# Patient Record
Sex: FEMALE | Race: WHITE | HISPANIC OR LATINO | Employment: UNEMPLOYED | URBAN - METROPOLITAN AREA
[De-identification: names, ages, dates, MRNs, and addresses within clinical notes are randomized per-mention and may not be internally consistent; named-entity substitution may affect disease eponyms.]

---

## 2017-08-23 ENCOUNTER — OFFICE VISIT (OUTPATIENT)
Dept: MEDICAL GROUP | Facility: PHYSICIAN GROUP | Age: 57
End: 2017-08-23
Payer: OTHER GOVERNMENT

## 2017-08-23 VITALS
BODY MASS INDEX: 32.59 KG/M2 | HEIGHT: 60 IN | DIASTOLIC BLOOD PRESSURE: 72 MMHG | OXYGEN SATURATION: 96 % | WEIGHT: 166 LBS | RESPIRATION RATE: 14 BRPM | SYSTOLIC BLOOD PRESSURE: 130 MMHG | TEMPERATURE: 98.4 F | HEART RATE: 68 BPM

## 2017-08-23 DIAGNOSIS — K76.89 HEPATIC CYST: ICD-10-CM

## 2017-08-23 DIAGNOSIS — M67.40 GANGLION CYST: ICD-10-CM

## 2017-08-23 DIAGNOSIS — R92.1 BREAST CALCIFICATIONS ON MAMMOGRAM: ICD-10-CM

## 2017-08-23 DIAGNOSIS — E78.2 MIXED HYPERLIPIDEMIA: ICD-10-CM

## 2017-08-23 DIAGNOSIS — F41.9 ANXIETY: ICD-10-CM

## 2017-08-23 DIAGNOSIS — E66.9 OBESITY (BMI 30-39.9): ICD-10-CM

## 2017-08-23 DIAGNOSIS — G47.33 OSA ON CPAP: ICD-10-CM

## 2017-08-23 PROCEDURE — 99204 OFFICE O/P NEW MOD 45 MIN: CPT | Performed by: NURSE PRACTITIONER

## 2017-08-23 RX ORDER — FLUOXETINE HYDROCHLORIDE 20 MG/1
20 CAPSULE ORAL DAILY
COMMUNITY
End: 2017-08-23 | Stop reason: SDUPTHER

## 2017-08-23 RX ORDER — FLUTICASONE PROPIONATE 50 MCG
1 SPRAY, SUSPENSION (ML) NASAL DAILY
COMMUNITY
End: 2017-08-23 | Stop reason: SDUPTHER

## 2017-08-23 RX ORDER — FLUTICASONE PROPIONATE 50 MCG
1 SPRAY, SUSPENSION (ML) NASAL DAILY
Qty: 16 G | Refills: 6 | Status: SHIPPED | OUTPATIENT
Start: 2017-08-23 | End: 2018-09-28 | Stop reason: SDUPTHER

## 2017-08-23 RX ORDER — ROSUVASTATIN CALCIUM 10 MG/1
10 TABLET, COATED ORAL EVERY EVENING
Qty: 90 TAB | Refills: 2 | Status: SHIPPED | OUTPATIENT
Start: 2017-08-23 | End: 2018-12-04 | Stop reason: SDUPTHER

## 2017-08-23 RX ORDER — ROSUVASTATIN CALCIUM 10 MG/1
10 TABLET, COATED ORAL EVERY EVENING
COMMUNITY
End: 2017-08-23 | Stop reason: SDUPTHER

## 2017-08-23 RX ORDER — FLUOXETINE HYDROCHLORIDE 20 MG/1
20 CAPSULE ORAL DAILY
Qty: 90 CAP | Refills: 3 | Status: SHIPPED | OUTPATIENT
Start: 2017-08-23 | End: 2018-08-31 | Stop reason: SDUPTHER

## 2017-08-23 ASSESSMENT — PATIENT HEALTH QUESTIONNAIRE - PHQ9: CLINICAL INTERPRETATION OF PHQ2 SCORE: 0

## 2017-08-23 NOTE — MR AVS SNAPSHOT
Rima Pope   2017 1:20 PM   Office Visit   MRN: [id number]    Department:  [facility]   Dept Phone:  [phone]    Description:  Female : 1960   Provider:  MEDHAT Orr           Reason for Visit     Establish Care     Medication Refill           Allergies as of 2017     Allergen Noted Reactions    Sulfa Drugs 2017         You were diagnosed with     Mixed hyperlipidemia   [272.2.ICD-9-CM]       Hepatic cyst   [939272]       Breast calcifications on mammogram   [778473]       Ganglion cyst   [589606]       Obesity (BMI 30-39.9)   [367693]       MAXX on CPAP   [037833]         Vital Signs     Blood Pressure Pulse Temperature Respirations Height Weight    130/72 mmHg 68 36.9 °C (98.4 °F) 14 1.524 m (5') 75.297 kg (166 lb)    Body Mass Index Oxygen Saturation Smoking Status             32.42 kg/m2 96% Never Smoker          Basic Information     Date Of Birth Sex Race Ethnicity Preferred Language    1960 Female White  Origin (Italian,Rwandan,Bhutanese,Tej, etc) English      Problem List              ICD-10-CM Priority Class Noted - Resolved    Mixed hyperlipidemia E78.2   2017 - Present    Hepatic cyst K76.89   2017 - Present    Breast calcifications on mammogram R92.1   2017 - Present    Ganglion cyst M67.40   2017 - Present    Obesity (BMI 30-39.9) E66.9   2017 - Present    MAXX on CPAP G47.33, Z99.89   2017 - Present      Health Maintenance     Patient has no pending health maintenance at this time      Current Immunizations     No immunizations on file.      Below and/or attached are the medications your provider expects you to take. Review all of your home medications and newly ordered medications with your provider and/or pharmacist. Follow medication instructions as directed by your provider and/or pharmacist. Please keep your medication list with you and share with your provider. Update the information when  medications are discontinued, doses are changed, or new medications (including over-the-counter products) are added; and carry medication information at all times in the event of emergency situations     Allergies:  SULFA DRUGS - (reactions not documented)               Medications  Valid as of: August 23, 2017 -  2:01 PM    Generic Name Brand Name Tablet Size Instructions for use    FLUoxetine HCl (Cap) PROZAC 20 MG Take 1 Cap by mouth every day.        Fluticasone Propionate (Suspension) FLONASE 50 MCG/ACT Spray 1 Spray in nose every day.        Rosuvastatin Calcium (Tab) CRESTOR 10 MG Take 1 Tab by mouth every evening.        .                 Medicines prescribed today were sent to:     [facility] [address] [place] [address] [place]    [address] [place] [id number]    Phone: [phone] Fax: [phone]    Open 24 Hours?: No      Medication refill instructions:       If your prescription bottle indicates you have medication refills left, it is not necessary to call your provider’s office. Please contact your pharmacy and they will refill your medication.    If your prescription bottle indicates you do not have any refills left, you may request refills at any time through one of the following ways: The online [employer] system (except Urgent Care), by calling your provider’s office, or by asking your pharmacy to contact your provider’s office with a refill request. Medication refills are processed only during regular business hours and may not be available until the next business day. Your provider may request additional information or to have a follow-up visit with you prior to refilling your medication.   *Please Note: Medication refills are assigned a new Rx number when refilled electronically. Your pharmacy may indicate that no refills were authorized even though a new prescription for the same medication is available at the pharmacy. Please request  the medicine by name with the pharmacy before contacting your provider for a refill.        Referral     A referral request has been sent to our patient care coordination department. Please allow 3-5 business days for us to process this request and contact you either by phone or mail. If you do not hear from us by the 5th business day, please call us at [phone].           avox Access Code: [email or web address]  Expires: 9/22/2017  2:01 PM    avox  A secure, online tool to manage your health information     [employer]’s avox® is a secure, online tool that connects you to your personalized health information from the privacy of your home -- day or night - making it very easy for you to manage your healthcare. Once the activation process is completed, you can even access your medical information using the avox john, which is available for free in the [employer] John store or Google Play store.     avox provides the following levels of access (as shown below):   My Chart Features   [facility] Primary Care Doctor [facility]  Specialists [facility]  Urgent  [facility]  Primary Care  Doctor   Email your healthcare team securely and privately 24/7 X X X    Manage appointments: schedule your next appointment; view details of past/upcoming appointments X      Request prescription refills. X      View recent personal medical records, including lab and immunizations X X X X   View health record, including health history, allergies, medications X X X X   Read reports about your outpatient visits, procedures, consult and ER notes X X X X   See your discharge summary, which is a recap of your hospital and/or ER visit that includes your diagnosis, lab results, and care plan. X X       How to register for avox:  1. Go to  [email or web address].  2. Click on the Sign Up Now box, which takes you to the New Member Sign Up page. You will need to provide the following information:  a. Enter your avox Access Code  exactly as it appears at the top of this page. (You will not need to use this code after you’ve completed the sign-up process. If you do not sign up before the expiration date, you must request a new code.)   b. Enter your date of birth.   c. Enter your home email address.   d. Click Submit, and follow the next screen’s instructions.  3. Create a [employer] ID. This will be your [employer] login ID and cannot be changed, so think of one that is secure and easy to remember.  4. Create a [employer] password. You can change your password at any time.  5. Enter your Password Reset Question and Answer. This can be used at a later time if you forget your password.   6. Enter your e-mail address. This allows you to receive e-mail notifications when new information is available in [employer].  7. Click Sign Up. You can now view your health information.    For assistance activating your [employer] account, call [phone]

## 2017-08-23 NOTE — ASSESSMENT & PLAN NOTE
Ultrasound showed a cyst.  Liver enzymes elevated and then she stopped all tylenol and alcohol and within 3 months her enzymes returned to normal.  No hepatitis.

## 2017-08-23 NOTE — ASSESSMENT & PLAN NOTE
Patient reports that she is watching what she is eating and drinking. She has a strong family history of alcoholism and so she has been watching calories and alcohol intake. She is recently moved here and is excited about hiking. She has some friends that she walks and hikes with.

## 2017-08-23 NOTE — ASSESSMENT & PLAN NOTE
Taking crestor 5 mg daily. Patient had recent labs in [facility] in March or February. Patient will bring those labs 5 for us to review.

## 2017-08-23 NOTE — ASSESSMENT & PLAN NOTE
Patient states that she has been on Prozac for approximately 12 years. She started on Prozac after her hysterectomy in her early 40s. She states it helped her with her menopausal symptoms and also has helps her with her anxiety.

## 2017-08-23 NOTE — ASSESSMENT & PLAN NOTE
Patient reports a small nontender lump on the inner aspect of her right wrist just below her thumb. States that she uses this hand for repetitive motion often. She denies numbness or tingling. She states it has been there approximately 2-3 months.

## 2017-08-23 NOTE — ASSESSMENT & PLAN NOTE
Wearing CPAP most nights. Has been on CPAP for approximately 4-5 years. She would like an updated machine and appliances. She is requesting referral to pulmonology at this time. States that since she has started using CPAP she does sleep better and she does feel more rested in the mornings.

## 2017-08-23 NOTE — PROGRESS NOTES
Rima Pope is a 56 y.o. white female here today to establish care and for evaluation and management of:    HPI: Rima is a . 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  Hepatic cyst  Ultrasound showed a cyst.  Liver enzymes elevated and then she stopped all tylenol and alcohol and within 3 months her enzymes returned to normal.  No hepatitis.     Mixed hyperlipidemia  Taking crestor 5 mg daily. Patient had recent labs in [facility] in March or February. Patient will bring those labs 5 for us to review.    Breast calcifications on mammogram  Has had some breast biopsies that were all negative.  All mammograms negative.     MAXX on CPAP  Wearing CPAP most nights. Has been on CPAP for approximately 4-5 years. She would like an updated machine and appliances. She is requesting referral to pulmonology at this time. States that since she has started using CPAP she does sleep better and she does feel more rested in the mornings.    Obesity (BMI 30-39.9)  Patient reports that she is watching what she is eating and drinking. She has a strong family history of alcoholism and so she has been watching calories and alcohol intake. She is recently moved here and is excited about hiking. She has some friends that she walks and hikes with.    Ganglion cyst  Patient reports a small nontender lump on the inner aspect of her right wrist just below her thumb. States that she uses this hand for repetitive motion often. She denies numbness or tingling. She states it has been there approximately 2-3 months.    Anxiety  Patient states that she has been on Prozac for approximately 12 years. She started on Prozac after her hysterectomy in her early 40s. She states it helped her with her menopausal symptoms and also has helps her with her anxiety.        Current medicines (including changes today)  Current Outpatient Prescriptions   Medication Sig Dispense Refill   •  fluoxetine (PROZAC) 20 MG Cap Take 1 Cap by mouth every day. 90 Cap 3   • fluticasone (FLONASE) 50 MCG/ACT nasal spray Spray 1 Spray in nose every day. 16 g 6   • rosuvastatin (CRESTOR) 10 MG Tab Take 1 Tab by mouth every evening. 90 Tab 2     No current facility-administered medications for this visit.       She  has a past medical history of Hyperlipidemia and Anxiety.    She  has past surgical history that includes primary c section; abdominal hysterectomy total; and hernia repair.    Social History   Substance Use Topics   • Smoking status: Never Smoker    • Smokeless tobacco: Never Used   • Alcohol Use: 1.2 oz/week     2 Glasses of wine per week      Comment: week       Social History     Social History Narrative   • No narrative on file       Family History   Problem Relation Age of Onset   • Cancer Mother      lung, bladder, melanoma   • Hyperlipidemia Mother    • Alcohol/Drug Father    • Alcohol/Drug Sister    • Alcohol/Drug Brother    • Alcohol/Drug Brother        Family Status   Relation Status Death Age   • Mother Alive    • Father     • Sister     • Brother Alive    • Brother           ROS  As stated in history of present illness.  All other systems reviewed and are negative     Objective:     Blood pressure 130/72, pulse 68, temperature 36.9 °C (98.4 °F), resp. rate 14, height 1.524 m (5'), weight 75.297 kg (166 lb), SpO2 96 %. Body mass index is 32.42 kg/(m^2).  Physical Exam:    Constitutional: Alert, no distress.  Skin: Warm, dry, good turgor, no rashes in visible areas.  Eye: Equal, round and reactive, conjunctiva clear, lids normal.  ENMT: Lips without lesions, good dentition, oropharynx clear.  Neck: Trachea midline, no masses, no thyromegaly. No cervical or supraclavicular lymphadenopathy.  Respiratory: Unlabored respiratory effort, lungs clear to auscultation, no wheezes, no ronchi.  Cardiovascular: Normal S1, S2, no murmur, no edema.  Abdomen: Soft, non-tender, no  masses, no hepatosplenomegaly.  Psych: Alert and oriented x3, normal affect and mood.  MSK: Right ganglion cyst on wrist. Nontender no numbness or tingling. No redness no fever      Assessment and Plan:   The following treatment plan was discussed    1. Mixed hyperlipidemia  This is a new problem to me. Chronic. Ongoing. Continue with Crestor 5 mg by mouth daily. Will obtain most recent lab results to review. Follow-up in one year.    2. Hepatic cyst  This is a new problem to me. Chronic. Stabilized. Patient would like to monitor this hepatic cyst with ultrasound every few years. We will take a look at her last liver function tests. Monitor and follow.    3. Breast calcifications on mammogram  This is a new problem to me. Chronic. Stable. Last mammogram was April of this year. No abnormal findings. Will be due for mammogram in one year. Monitor and follow.    4. Ganglion cyst  This is a new problem to me. Acute. Stable. Monitor and watch. If the cyst grows or becomes painful will consider referral to a hand surgeon. Monitor.    5. Obesity (BMI 30-39.9)  This is a new problem to me. Chronic. Ongoing. Discussed diet and exercise. Encouraged her hiking. Encourage daily walking. Monitor and follow.  - Patient identified as having weight management issue.  Appropriate orders and counseling given.    6. MAXX on CPAP  This is a new problem to me. Chronic. Ongoing. Referral to pulmonology for evaluation of her CPAP. Monitor follow-up.  - REFERRAL TO PULMONOLOGY    7. Anxiety  This is a new problem to me. Chronic. Stable. Continue Prozac 20 mg by mouth daily. Denies any suicidal ideations. Monitor and follow.      Records requested.  Followup: Return in about 1 year (around 8/23/2018) for Annual.

## 2017-09-26 ENCOUNTER — APPOINTMENT (OUTPATIENT)
Dept: SLEEP MEDICINE | Facility: MEDICAL CENTER | Age: 57
End: 2017-09-26
Payer: OTHER GOVERNMENT

## 2017-10-19 ENCOUNTER — OFFICE VISIT (OUTPATIENT)
Dept: MEDICAL GROUP | Facility: PHYSICIAN GROUP | Age: 57
End: 2017-10-19
Payer: OTHER GOVERNMENT

## 2017-10-19 VITALS
BODY MASS INDEX: 32.79 KG/M2 | WEIGHT: 167 LBS | TEMPERATURE: 98.1 F | RESPIRATION RATE: 14 BRPM | HEIGHT: 60 IN | HEART RATE: 67 BPM | OXYGEN SATURATION: 98 % | SYSTOLIC BLOOD PRESSURE: 124 MMHG | DIASTOLIC BLOOD PRESSURE: 80 MMHG

## 2017-10-19 DIAGNOSIS — Z12.11 ENCOUNTER FOR SCREENING FECAL OCCULT BLOOD TESTING: ICD-10-CM

## 2017-10-19 DIAGNOSIS — E78.2 MIXED HYPERLIPIDEMIA: ICD-10-CM

## 2017-10-19 DIAGNOSIS — J32.0 CHRONIC MAXILLARY SINUSITIS: ICD-10-CM

## 2017-10-19 PROCEDURE — 99214 OFFICE O/P EST MOD 30 MIN: CPT | Performed by: NURSE PRACTITIONER

## 2017-10-19 RX ORDER — AZITHROMYCIN 250 MG/1
TABLET, FILM COATED ORAL
Qty: 6 TAB | Refills: 0 | Status: SHIPPED | OUTPATIENT
Start: 2017-10-19 | End: 2017-10-24

## 2017-10-19 NOTE — ASSESSMENT & PLAN NOTE
Started with a cold 3 weeks ago that started getting better but now has developed left facial pain, swelling on left side, low grade fever. Green exudate, fatigue. Pursuing this.

## 2017-10-19 NOTE — PROGRESS NOTES
Chief Complaint   Patient presents with   • Sinus Problem       Subjective:   Rima Pope is a 56 y.o. female here today for evaluation and management of:    Chronic maxillary sinusitis  Started with a cold 3 weeks ago that started getting better but now has developed left facial pain, swelling on left side, low grade fever. Green exudate, fatigue. Pursuing this.    Mixed hyperlipidemia  Continues crestor 5 mg.             Current medicines (including changes today)  Current Outpatient Prescriptions   Medication Sig Dispense Refill   • azithromycin (ZITHROMAX) 250 MG Tab 2 tabs by mouth day 1, 1 tab by mouth days 2-5 6 Tab 0   • fluoxetine (PROZAC) 20 MG Cap Take 1 Cap by mouth every day. 90 Cap 3   • fluticasone (FLONASE) 50 MCG/ACT nasal spray Spray 1 Spray in nose every day. 16 g 6   • rosuvastatin (CRESTOR) 10 MG Tab Take 1 Tab by mouth every evening. 90 Tab 2     No current facility-administered medications for this visit.      She  has a past medical history of Anxiety and Hyperlipidemia.    Maguire stated in history of present illness.  No chest pain, no shortness of breath, no abdominal pain       Objective:     Blood pressure 124/80, pulse 67, temperature 36.7 °C (98.1 °F), resp. rate 14, height 1.524 m (5'), weight 75.8 kg (167 lb), SpO2 98 %. Body mass index is 32.61 kg/m². Afebrile after ibuprofen this morning  Physical Exam:  Constitutional: Alert, no distress.  Skin: Warm, dry, good turgor,no cyanosis, no rashes in visible areas.  Eye: Equal, round and reactive, conjunctiva clear, lids normal.  Ears: No tenderness, no discharge.  External canals are without any significant edema or erythema.  Tympanic membranes are without any inflammation, no effusion.  Gross auditory acuity is intact.  Nose: Left nares extremely red and swollen yellow exudate left face has extreme tenderness and swelling noted over cheek bone. Right face without swelling but is tender to touch.  Mouth/Throat: lips without lesion.   Oropharynx clear.  Throat without erythema, exudates or tonsillar enlargement.  Neck: Trachea midline, no masses, no obvious thyroid enlargement.. No cervical or supraclavicular lymphadenopathy. Range of motion within normal limits.  Neuro: Cranial nerves 2-12 grossly intact.  No sensory deficit.  Respiratory: Unlabored respiratory effort, lungs clear to auscultation, no wheezes, no ronchi.  Cardiovascular: Normal S1, S2, no murmur, no edema.  Psych: Alert and oriented x3, normal affect and mood and judgement.        Assessment and Plan:   The following treatment plan was discussed    1. Chronic maxillary sinusitis  This is a new problem to me. Acute. An stable. Z-Inder ordered per patient request. Increase fluids increase nasal irrigation. Continue with Flonase daily. Continue with controlling allergies. Referral to ENT placed. Monitor and follow.  - REFERRAL TO ENT    2. Mixed hyperlipidemia  Chronic issue. Patient will be due for labs in 6 months. Labs provided. Patient to return in 6 months to follow-up on her hyperlipidemia. Continue with Crestor 5 mg daily. Diet and exercise reviewed. Monitor and follow.  - COMP METABOLIC PANEL; Future  - LIPID PROFILE; Future      Followup: Return in about 6 months (around 4/19/2018) for hyperlipidemia.

## 2017-10-20 ENCOUNTER — SLEEP CENTER VISIT (OUTPATIENT)
Dept: SLEEP MEDICINE | Facility: MEDICAL CENTER | Age: 57
End: 2017-10-20
Payer: OTHER GOVERNMENT

## 2017-10-20 VITALS
OXYGEN SATURATION: 95 % | DIASTOLIC BLOOD PRESSURE: 82 MMHG | SYSTOLIC BLOOD PRESSURE: 122 MMHG | HEIGHT: 60 IN | HEART RATE: 78 BPM | BODY MASS INDEX: 33.16 KG/M2 | WEIGHT: 168.9 LBS | TEMPERATURE: 97.5 F | RESPIRATION RATE: 16 BRPM

## 2017-10-20 DIAGNOSIS — G47.33 OSA ON CPAP: ICD-10-CM

## 2017-10-20 DIAGNOSIS — E66.9 OBESITY (BMI 30-39.9): ICD-10-CM

## 2017-10-20 PROCEDURE — 99203 OFFICE O/P NEW LOW 30 MIN: CPT | Performed by: INTERNAL MEDICINE

## 2017-10-20 RX ORDER — ZOLPIDEM TARTRATE 5 MG/1
5 TABLET ORAL NIGHTLY PRN
Qty: 3 TAB | Refills: 0 | Status: SHIPPED | OUTPATIENT
Start: 2017-10-20 | End: 2018-06-27

## 2017-10-20 NOTE — PROGRESS NOTES
Chief Complaint   Patient presents with   • Establish Care     Referred by RUSSEL Orr for MAXX.       HPI: This patient is a 56 y.o. female who is referred for obstructive sleep apnea. She was diagnosed with MAXX in [place] 5 years ago and has been on CPAP therapy since then. She forgot her CPAP compliance chip today. She states she uses CPAP nightly, and feels rested, however requires new CPAP supplies. Sleep study results are unavailable. Sleep diary was reviewed. She goes to bed by 11:30 PM, falling asleep within 15 minutes. She denies any significant awakenings. She wakes up at 8 AM feeling rested. Her [place] sleepiness score is 7. Her BMI is greater than 30.      Past Medical History:   Diagnosis Date   • Allergic rhinitis    • Anxiety    • Hyperlipidemia    • Nasal drainage        Social History     Social History   • Marital status:      Spouse name: N/A   • Number of children: N/A   • Years of education: N/A     Occupational History   • Not on file.     Social History Main Topics   • Smoking status: Never Smoker   • Smokeless tobacco: Never Used   • Alcohol use 1.2 oz/week     2 Glasses of wine per week      Comment: week   • Drug use: No   • Sexual activity: Yes     Partners: Male     Other Topics Concern   • Not on file     Social History Narrative   • No narrative on file       Family History   Problem Relation Age of Onset   • Cancer Mother      lung, bladder, melanoma   • Hyperlipidemia Mother    • Alcohol/Drug Father    • Alcohol/Drug Sister    • Alcohol/Drug Brother    • Alcohol/Drug Brother    • No Known Problems Son    • No Known Problems Daughter        Current Outpatient Prescriptions on File Prior to Visit   Medication Sig Dispense Refill   • azithromycin (ZITHROMAX) 250 MG Tab 2 tabs by mouth day 1, 1 tab by mouth days 2-5 6 Tab 0   • fluoxetine (PROZAC) 20 MG Cap Take 1 Cap by mouth every day. 90 Cap 3   • fluticasone (FLONASE) 50 MCG/ACT nasal spray Spray 1 Spray in nose every  day. 16 g 6   • rosuvastatin (CRESTOR) 10 MG Tab Take 1 Tab by mouth every evening. 90 Tab 2     No current facility-administered medications on file prior to visit.        Allergies: Sulfa drugs    ROS:   Constitutional: Denies fevers, chills, night sweats, fatigue or weight loss  Eyes: Denies vision loss, pain, drainage, double vision  Ears, Nose, Throat: Denies earache, difficulty hearing, tinnitus, nasal congestion, hoarseness  Cardiovascular: Denies chest pain, tightness, palpitations, orthopnea or edema  Respiratory: Denies shortness of breath, cough, wheezing, hemoptysis  Sleep: Denies daytime sleepiness, snoring, apneas, insomnia, morning headaches  GI: Denies heartburn, dysphagia, nausea, abdominal pain, diarrhea or constipation  : Denies frequent urination, hematuria, discharge or painful urination  Musculoskeletal: Denies back pain, painful joints, sore muscles  Neurological: Denies weakness or headaches  Skin: No rashes    Blood pressure 122/82, pulse 78, temperature 36.4 °C (97.5 °F), resp. rate 16, height 1.524 m (5'), weight 76.6 kg (168 lb 14.4 oz), SpO2 95 %.    Physical Exam:  Appearance: Well-nourished, well-developed, in no acute distress  HEENT: Normocephalic, atraumatic, white sclera, PERRLA, oropharynx clear, Mallampati 3  Neck: No adenopathy or masses  Respiratory: no intercostal retractions or accessory muscle use  Lungs auscultation: Clear to auscultation bilaterally  Cardiovascular: Regular rate rhythm. No murmurs, rubs or gallops.  No LE edema  Abdomen: soft, nondistended  Gait: Normal  Digits: No clubbing, cyanosis  Motor: No focal deficits  Orientation: Oriented to time, person and place    Diagnosis:  1. MAXX on CPAP  POLYSOMNOGRAPHY, 4 OR MORE    zolpidem (AMBIEN) 5 MG Tab   2. Obesity (BMI 30-39.9)         Plan:  The patient has a history of obstructive sleep apnea diagnosed in [place] 5 years ago, on CPAP therapy. Clinically she shows good response to treatment and is interested in  continuing with CPAP use.  She requires an updated polysomnogram for reassessment of MAXX and qualification of CPAP and accurate titration of pressures.  We will also establish her with a DME for new CPAP machine and supplies.  Return for after sleep study.

## 2017-10-30 ENCOUNTER — TELEPHONE (OUTPATIENT)
Dept: MEDICAL GROUP | Facility: PHYSICIAN GROUP | Age: 57
End: 2017-10-30

## 2017-11-15 ENCOUNTER — HOSPITAL ENCOUNTER (OUTPATIENT)
Facility: MEDICAL CENTER | Age: 57
End: 2017-11-15
Attending: NURSE PRACTITIONER
Payer: OTHER GOVERNMENT

## 2017-11-15 PROCEDURE — 82274 ASSAY TEST FOR BLOOD FECAL: CPT

## 2017-11-30 DIAGNOSIS — Z12.11 ENCOUNTER FOR SCREENING FECAL OCCULT BLOOD TESTING: ICD-10-CM

## 2017-11-30 LAB — HEMOCCULT STL QL IA: NEGATIVE

## 2017-12-04 ENCOUNTER — TELEPHONE (OUTPATIENT)
Dept: MEDICAL GROUP | Facility: PHYSICIAN GROUP | Age: 57
End: 2017-12-04

## 2017-12-04 NOTE — TELEPHONE ENCOUNTER
----- Message from MEDHAT Orr sent at 12/4/2017 12:32 PM PST -----  Please notify patient that stool test is negative for blood.  This test should be repeated annually for colon cancer screening.  Thanks for getting this completed!  MEDHAT Orr

## 2017-12-04 NOTE — LETTER
December 4, 2017         Rima Pope  [address] [facility] [address] [facility] [address]        Dear Rima:      Below are the results from your recent visit:    Please notify patient that stool test is negative for blood.  This test should be repeated annually for colon cancer screening.  Thanks for getting this completed!  MEDHAT Orr Orders   OCCULT BLOOD FECES IMMUNOASSAY   Result Value Ref Range    Occult Blood, IA Negative Negative         Electronically Signed

## 2018-01-03 ENCOUNTER — APPOINTMENT (OUTPATIENT)
Dept: SLEEP MEDICINE | Facility: MEDICAL CENTER | Age: 58
End: 2018-01-03
Attending: INTERNAL MEDICINE
Payer: OTHER GOVERNMENT

## 2018-02-16 ENCOUNTER — TELEPHONE (OUTPATIENT)
Dept: PULMONOLOGY | Facility: HOSPICE | Age: 58
End: 2018-02-16

## 2018-02-17 NOTE — TELEPHONE ENCOUNTER
Verified the day supply (2 days) and ICD 10 code (G47.33) for the rx Ambien prescribed (10/17/17) by Dr. Unger.

## 2018-02-19 ENCOUNTER — SLEEP STUDY (OUTPATIENT)
Dept: SLEEP MEDICINE | Facility: MEDICAL CENTER | Age: 58
End: 2018-02-19
Attending: INTERNAL MEDICINE
Payer: OTHER GOVERNMENT

## 2018-02-19 DIAGNOSIS — G47.33 OSA ON CPAP: ICD-10-CM

## 2018-02-19 PROCEDURE — 95811 POLYSOM 6/>YRS CPAP 4/> PARM: CPT | Performed by: FAMILY MEDICINE

## 2018-02-20 NOTE — PROCEDURES
CLINICAL COMMENTS:  The patient underwent a split night polysomnogram with a CPAP titration using the standard montage for measurement of parameters of sleep, respiratory events, movement abnormalities, heart rate and rhythm. A microphone was used to monitor snoring.    INTERPRETATION: Lights out was recorded at 8:05pm. The diagnostic recording time was 332.3 minutes with a sleep period of 214.2 minutes.  Total sleep time was 206.5 minutes with a sleep efficiency of 62.2%.  The sleep latency was 118.0 minutes, and REM latency was N/A minutes.  The patient had 48 arousals in total, for an arousal index of 13.9.        RESPIRATORY: The patient had 3 apneas in total.  Of these, 2 were obstructive apneas, and 1 were central apneas.  This resulted in an apnea index (AI) of 0.9.  The patient had 49 hypopneas in total, which resulted in a hypopnea index of 14.2.  The overall AHI was 15.1, while the AHI during REM was N/A.  The supine AHI = N/A.    OXIMETRY: Oxygen saturation monitoring showed a mean SpO2 of 92.5% for the diagnostic part of the study, with a minimum oxygen saturation of 86.0%.  Oxygen saturations were below 89% for 0.8% of sleep time.    CARDIAC: The highest heart rate for the first part of the study was 103.0 beats per minute.  The average heart rate during sleep was 65.6 bpm, while the highest heart rate was 81.0 bpm.    LIMB MOVEMENTS: There were a total of 247 periodic limb movements during sleep, of which 17 were PLMS arousals.  This resulted in a PLMS index of 71.8 and a PLMS arousal index of 4.9.    TREATMENT    Treatment recording time was 218.7 minutes with a total sleep time of 206.0min.  The patient had an arousal index of 10.5.      RESPIRATORY: The patient had 0 obstructive apneas, 2 central apneas, and 7 hypopneas for an overall AHI was 2.6.    OXIMETRY: The mean SpO2 during treatment was 93.7%, with a minimum oxygen saturation of 90.0%.      CPAP was tried from 5cm H2O to 9cmH2O.    Technical  summary: The patient underwent a split-night polysomnogram. This was a 16 channel montage study to include a 6 channel EEG, a 2 channel EOG, and chin EMG, left and right leg EMG, a snore channel, a nasal pressure transducer, and a nasal oral airflow semester and a CFLOW pressure transducer.   Respiratory effort was assessed with the use of a thoracic and abdominal monitor and overnight oximetry was obtained. Audio and video recordings were reviewed. This was a fully attended study and sleep stage scoring was performed. The test was technically adequate.    General sleep summary:      Diagnostic:  During the overnight study, the sleep latency was 118 min which is prolonged. The total sleep time was 206 min and sleep efficiency was 62% which is decreased.  Sleep stage proportions showed no REM sleep and decreased N3 sleep.  In regards to sleep quality there was a mild degree of sleep fragmentation as shown by the arousal index of 13 an hour which is increased. The arousals were due to respiratory events.    Treatment: During the treatment portion of the study, REM was observed.   The total sleep time was 206 and sleep efficiency was 94%.  Sleep stage proportions showed no N3 sleep.  In regards to sleep quality there was a mild degree of sleep fragmentation as shown by the arousal index of 10/hr. The arousals were due to spontaneous arousal.    Respiratory summary:   Diagnostic: During the diagnostic portion of the the study, the patient demonstrated moderate obstructive sleep apnea as shown by the apnea hypopnea index of 15.1/hr. There are a total of 3 apneas and 49 hypopneas. In the supine position the respiratory disturbance index was 0 an hour and in the non-supine position the respiratory disturbance index was 15 per hour. The O2 pool was 85% and the average SpO2 was 92 %. There was mild snoring. The patient demonstrated a NSr with an average heartbeat of 65 bpm.     CPAP Titration:  Due to the significant number  of obstructive respiratory events observed during the diagnostic portion of the study a CPAP titration trial was performed during the second half of the night. The CPAP pressure was initiated at 5 cm of water and the pressure was increased in an attempt to eliminate all sleep disordered breathing and snoring. The CPAP pressure was increased to 9 cm water and at this final pressure the patient was observed in the supine position and in the REM sleep stage. The apnea hypopnea index improved to 1.9/hr with improved O2 pool of  91%. Snoring was resolved.  The patient continued to demonstrate NSR  and the average HR was 65. The patient utilized small amaraview mask with heated humidification. The CPAP was well-tolerated and there was minimal air leaks. No supplemental oxygen was required.    Periodic limb movement summary:   Diagnostic:The patient demonstrated an normal degree of periodic limb movements as shown by the PLM index of 0.9 per hour and normal PLM arousal index of 4.9.    Treatment:The patient demonstrated an normal degree of periodic limb movements as shown by the PLM index of 0.6 per hour and normal PLM arousal index of 0.5/hr.  Impression:  1.  Moderate MAXX  2.  Moderate obstructive sleep apnea with AHI of 15.1/hr. Due to severity of the disease he met the split study protocol. It was a successful titration. The titration started with CPAP 5 cm and the best tolerated was 9 cm. The AHI improved to 1.9/hr with improved O2 pool of 91% and average O2 saturation of 94 %.       Recommendations:  I recommend CPAP of 9 cm with small amaraview mask. I also recommend 30 day compliance download to assess the efficacy to the recommended pressure, measure leak, apnea hypopnea index and compliance for further outpatient monitoring and management of CPAP therapy. In some cases alternative treatment options may prove effective in resolving sleep apnea and these options include upper airway surgery, the use of a dental  orthotic or weight loss and positional therapy. Clinical correlation is required. In general patients with sleep apnea are advised to avoid alcohol and sedatives and to not operate a motor vehicle while drowsy and are at a greater risk for cardiovascular disease.

## 2018-03-21 ENCOUNTER — APPOINTMENT (OUTPATIENT)
Dept: SLEEP MEDICINE | Facility: MEDICAL CENTER | Age: 58
End: 2018-03-21
Payer: OTHER GOVERNMENT

## 2018-06-12 ENCOUNTER — APPOINTMENT (OUTPATIENT)
Dept: SLEEP MEDICINE | Facility: MEDICAL CENTER | Age: 58
End: 2018-06-12
Payer: OTHER GOVERNMENT

## 2018-06-20 ENCOUNTER — HOSPITAL ENCOUNTER (OUTPATIENT)
Dept: LAB | Facility: MEDICAL CENTER | Age: 58
End: 2018-06-20
Attending: NURSE PRACTITIONER
Payer: OTHER GOVERNMENT

## 2018-06-20 DIAGNOSIS — E78.2 MIXED HYPERLIPIDEMIA: ICD-10-CM

## 2018-06-20 LAB
ALBUMIN SERPL BCP-MCNC: 4.1 G/DL (ref 3.2–4.9)
ALBUMIN/GLOB SERPL: 1.3 G/DL
ALP SERPL-CCNC: 58 U/L (ref 30–99)
ALT SERPL-CCNC: 23 U/L (ref 2–50)
ANION GAP SERPL CALC-SCNC: 10 MMOL/L (ref 0–11.9)
AST SERPL-CCNC: 23 U/L (ref 12–45)
BILIRUB SERPL-MCNC: 0.5 MG/DL (ref 0.1–1.5)
BUN SERPL-MCNC: 10 MG/DL (ref 8–22)
CALCIUM SERPL-MCNC: 9.4 MG/DL (ref 8.5–10.5)
CHLORIDE SERPL-SCNC: 105 MMOL/L (ref 96–112)
CHOLEST SERPL-MCNC: 171 MG/DL (ref 100–199)
CO2 SERPL-SCNC: 24 MMOL/L (ref 20–33)
CREAT SERPL-MCNC: 0.69 MG/DL (ref 0.5–1.4)
GLOBULIN SER CALC-MCNC: 3.1 G/DL (ref 1.9–3.5)
GLUCOSE SERPL-MCNC: 83 MG/DL (ref 65–99)
HDLC SERPL-MCNC: 68 MG/DL
LDLC SERPL CALC-MCNC: 82 MG/DL
POTASSIUM SERPL-SCNC: 3.7 MMOL/L (ref 3.6–5.5)
PROT SERPL-MCNC: 7.2 G/DL (ref 6–8.2)
SODIUM SERPL-SCNC: 139 MMOL/L (ref 135–145)
TRIGL SERPL-MCNC: 103 MG/DL (ref 0–149)

## 2018-06-20 PROCEDURE — 36415 COLL VENOUS BLD VENIPUNCTURE: CPT

## 2018-06-20 PROCEDURE — 80061 LIPID PANEL: CPT

## 2018-06-20 PROCEDURE — 80053 COMPREHEN METABOLIC PANEL: CPT

## 2018-06-21 ENCOUNTER — TELEPHONE (OUTPATIENT)
Dept: MEDICAL GROUP | Facility: PHYSICIAN GROUP | Age: 58
End: 2018-06-21

## 2018-06-22 NOTE — TELEPHONE ENCOUNTER
----- Message from Dedra Norris M.D. sent at 6/21/2018 11:35 AM PDT -----  Please let patient know that her lab results are normal.  Dr. Norris covering for MEDHAT Orr

## 2018-06-27 ENCOUNTER — OFFICE VISIT (OUTPATIENT)
Dept: MEDICAL GROUP | Facility: PHYSICIAN GROUP | Age: 58
End: 2018-06-27
Payer: OTHER GOVERNMENT

## 2018-06-27 ENCOUNTER — HOSPITAL ENCOUNTER (OUTPATIENT)
Facility: MEDICAL CENTER | Age: 58
End: 2018-06-27
Attending: NURSE PRACTITIONER
Payer: OTHER GOVERNMENT

## 2018-06-27 VITALS
BODY MASS INDEX: 31.8 KG/M2 | SYSTOLIC BLOOD PRESSURE: 96 MMHG | HEART RATE: 68 BPM | OXYGEN SATURATION: 98 % | HEIGHT: 60 IN | WEIGHT: 162 LBS | TEMPERATURE: 97.7 F | DIASTOLIC BLOOD PRESSURE: 56 MMHG | RESPIRATION RATE: 14 BRPM

## 2018-06-27 DIAGNOSIS — Z12.39 SCREENING FOR BREAST CANCER: ICD-10-CM

## 2018-06-27 DIAGNOSIS — F43.21 GRIEVING: ICD-10-CM

## 2018-06-27 DIAGNOSIS — Z01.419 WELL WOMAN EXAM WITH ROUTINE GYNECOLOGICAL EXAM: ICD-10-CM

## 2018-06-27 DIAGNOSIS — Z01.419 GYNECOLOGIC EXAM NORMAL: ICD-10-CM

## 2018-06-27 PROCEDURE — 88175 CYTOPATH C/V AUTO FLUID REDO: CPT

## 2018-06-27 PROCEDURE — 99386 PREV VISIT NEW AGE 40-64: CPT | Mod: 25 | Performed by: NURSE PRACTITIONER

## 2018-06-27 PROCEDURE — 87624 HPV HI-RISK TYP POOLED RSLT: CPT

## 2018-06-27 PROCEDURE — 99000 SPECIMEN HANDLING OFFICE-LAB: CPT | Performed by: NURSE PRACTITIONER

## 2018-06-27 RX ORDER — LORAZEPAM 0.5 MG/1
0.5 TABLET ORAL EVERY 4 HOURS PRN
Qty: 60 TAB | Refills: 0 | Status: SHIPPED
Start: 2018-06-27 | End: 2018-07-27

## 2018-06-27 NOTE — LETTER
[facility]  MEDHAT Orr  [address]  [place] [address]  Fax: [phone]   Authorization for Release/Disclosure of   Protected Health Information   Name: RIMA POPE : 1960 SSN: [id number]   Address: [address] [place] [address] Phone:    [phone] (home)    I authorize the entity listed below to release/disclose the PHI below to:   [facility]/MED[facility]AT Orr and MEDHAT Orr   Provider or Entity Name:     Address   City, State, Zip   Phone:      Fax:     Reason for request: continuity of care   Information to be released:    [  ] LAST COLONOSCOPY,  including any PATH REPORT and follow-up  [  ] LAST FIT/COLOGUARD RESULT [  ] LAST DEXA  [  ] LAST MAMMOGRAM  [  ] LAST PAP  [  ] LAST LABS [  ] RETINA EXAM REPORT  [  ] IMMUNIZATION RECORDS  [  ] Release all info      [  ] Check here and initial the line next to each item to release ALL health information INCLUDING  _____ Care and treatment for drug and / or alcohol abuse  _____ HIV testing, infection status, or AIDS  _____ Genetic Testing    DATES OF SERVICE OR TIME PERIOD TO BE DISCLOSED: _____________  I understand and acknowledge that:  * This Authorization may be revoked at any time by you in writing, except if your health information has already been used or disclosed.  * Your health information that will be used or disclosed as a result of you signing this authorization could be re-disclosed by the recipient. If this occurs, your re-disclosed health information may no longer be protected by State or Federal laws.  * You may refuse to sign this Authorization. Your refusal will not affect your ability to obtain treatment.  * This Authorization becomes effective upon signing and will  on (date) __________.      If no date is indicated, this Authorization will  one (1) year from the signature date.    Name: Rima Pope    Signature:   Date:     2018       PLEASE FAX  REQUESTED RECORDS BACK TO: [phone]

## 2018-06-27 NOTE — ASSESSMENT & PLAN NOTE
Tearful and having a hard time. 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 Expressing anger about her  not being able to enjoy his detention. He retired one years year ago.

## 2018-06-28 DIAGNOSIS — Z01.419 GYNECOLOGIC EXAM NORMAL: ICD-10-CM

## 2018-06-28 NOTE — PROGRESS NOTES
CC:  Pap/Well Woman Exam    History of present illness:  Rima Pope is 57 y.o. white female presenting today for well woman exam with gynecological exam and Pap smear.   She reports her periods are absent due to hysterectomy.     Grieving  Tearful and having a hard time. 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 Expressing anger about her  not being able to enjoy his correction. He retired one years year ago.    Well woman exam with routine gynecological exam  Here for annual pap.  Hx of total Hysterectomy. Patient states that she did have one positive HPV several years ago but has not had any positive HPVs since then. Patient denies any vaginal bleeding bleeding pain or unusual discharge. No abdominal pain to constipation or diarrhea. Up-to-date on mammograms. Order provided. Last mammogram was within normal limits.      Past Medical History:   Diagnosis Date   • Allergic rhinitis    • Anxiety    • Hyperlipidemia    • Nasal drainage        Past Surgical History:   Procedure Laterality Date   • ABDOMINAL HYSTERECTOMY TOTAL     • HERNIA REPAIR     • HYSTERECTOMY LAPAROSCOPY     • PRIMARY C SECTION         Outpatient Encounter Prescriptions as of 6/27/2018   Medication Sig Dispense Refill   • LORazepam (ATIVAN) 0.5 MG Tab Take 1 Tab by mouth every four hours as needed for Anxiety for up to 30 days. 60 Tab 0   • fluoxetine (PROZAC) 20 MG Cap Take 1 Cap by mouth every day. 90 Cap 3   • fluticasone (FLONASE) 50 MCG/ACT nasal spray Spray 1 Spray in nose every day. 16 g 6   • rosuvastatin (CRESTOR) 10 MG Tab Take 1 Tab by mouth every evening. 90 Tab 2   • [DISCONTINUED] zolpidem (AMBIEN) 5 MG Tab Take 1 Tab by mouth at bedtime as needed for Sleep. Take up to 3 tablets at bedtime for sleep study 3 Tab 0     No facility-administered encounter medications on file as of 6/27/2018.        Patient Active Problem List    Diagnosis Date Noted   • Well woman exam with  routine gynecological exam 06/27/2018   • Grieving 06/27/2018   • Chronic maxillary sinusitis 10/19/2017   • Mixed hyperlipidemia 08/23/2017   • Hepatic cyst 08/23/2017   • Breast calcifications on mammogram 08/23/2017   • Ganglion cyst 08/23/2017   • Obesity (BMI 30-39.9) 08/23/2017   • MAXX on CPAP 08/23/2017   • Anxiety 08/23/2017       .  Social History     Social History   • Marital status:      Spouse name: N/A   • Number of children: N/A   • Years of education: N/A     Occupational History   • Not on file.     Social History Main Topics   • Smoking status: Never Smoker   • Smokeless tobacco: Never Used   • Alcohol use 1.2 oz/week     2 Glasses of wine per week      Comment: week   • Drug use: No   • Sexual activity: Yes     Partners: Male     Other Topics Concern   • Not on file     Social History Narrative   • No narrative on file       Family History   Problem Relation Age of Onset   • Cancer Mother      lung, bladder, melanoma   • Hyperlipidemia Mother    • Alcohol/Drug Father    • Alcohol/Drug Sister    • Alcohol/Drug Brother    • Alcohol/Drug Brother    • No Known Problems Son    • No Known Problems Daughter          ROS:  Denies Weight loss, fatigue, chest pain, SOB, bowel or bladder changes. No significant dysmenorrhea, concerning vaginal discharge or irritation, no dyspareunia or postcoital bleeding. Denies h/o migraine with aura. Denies musculoskeletal, neurological, or psychiatric problems.      BP (!) 96/56   Pulse 68   Temp 36.5 °C (97.7 °F)   Resp 14   Ht 1.524 m (5')   Wt 73.5 kg (162 lb)   SpO2 98%   BMI 31.64 kg/m²     GEN:  Appears well and in no apparent distress   NECK:  Supple without adenopathy or thyromegaly  LUNGS:  Clear and equal. No wheeze, ronchi, or rales.  CV:  RRR, S1, S2. No murmur.  Pedal pulses 2+ bilaterally.  BREAST:  Symmetrical without masses. No nipple discharge.  ABD:  Soft, non-tender, non-distended, normal bowel sounds.  No hepatosplenomegaly.  :  Normal  external female genitalia.  Vaginal canal clear.  Cervix appears normal. Specimen collected from transformation zone. Bimanual exam:  No CMT, normal size uterus without masses or tenderness; no adnexal masses or tenderness.  PSYCH:  Tearful, sad from recent loss of  to dissecting aortic aneurysm.       Assessment and plan    1. Gynecologic exam normal  Here for exam.  Specimen from vaginal vault obtained.  To pathology  - THINPREP PAP WITH HPV; Future    2. Well woman exam with routine gynecological exam  Here for well woman exam.  Reviewed preventative care.  Labs reviewed.  All WNL.  Mammogram order provided.  Monitor and follow results.     3. Grieving  This is a new problem to me.  Acute, unstable.  RX for lorazepam 0.5 mg prn night time for sleep.  Return in 3 months for follow up.  Continue with grief conseling.    - LORazepam (ATIVAN) 0.5 MG Tab; Take 1 Tab by mouth every four hours as needed for Anxiety for up to 30 days.  Dispense: 60 Tab; Refill: 0    4. Screening for breast cancer  Due for mammo.  Order provided.  Monitor and follow results.   - MA-SCREEN MAMMO W/CAD-BILAT; Future      F/u pending results

## 2018-06-28 NOTE — ASSESSMENT & PLAN NOTE
Here for annual pap.  Hx of total Hysterectomy. Patient states that she did have one positive HPV several years ago but has not had any positive HPVs since then. Patient denies any vaginal bleeding bleeding pain or unusual discharge. No abdominal pain to constipation or diarrhea. Up-to-date on mammograms. Order provided. Last mammogram was within normal limits.

## 2018-06-30 LAB
CYTOLOGY REG CYTOL: NORMAL
HPV HR 12 DNA CVX QL NAA+PROBE: NEGATIVE
HPV16 DNA SPEC QL NAA+PROBE: NEGATIVE
HPV18 DNA SPEC QL NAA+PROBE: NEGATIVE
SPECIMEN SOURCE: NORMAL

## 2018-07-03 ENCOUNTER — TELEPHONE (OUTPATIENT)
Dept: MEDICAL GROUP | Facility: PHYSICIAN GROUP | Age: 58
End: 2018-07-03

## 2018-07-03 NOTE — LETTER
"July 3, 2018         Rima Pope  [address] [facility] [address]        Dear Rima:  Your PAP results are back and there is no evidence of infection or malignancy.  Recommend repeat testing in 2 years.   JEAN PAUL Orr.     Below are the results from your recent visit:    Resulted Orders   THINPREP PAP WITH HPV   Result Value Ref Range    Cytology Reg See Path Report       Comment:      A separate pathology report will follow after the Cytology  specimen has been received by the laboratory and the results  are signed out by a pathologist.  Please see \"Pathology GYN Specimen\" order for these results.      Source Cervical     HPV Genotype 16 Negative Negative    HPV Genotype 18 Negative Negative    HPV Other High Risk Genotypes Negative Negative      Comment:      This test detects the high-risk HPV types 16, 18, 31, 33,  35, 39, 45, 51, 52, 56, 58, 59, 66, and 68. It is intended  for use in women 21 years and older with ASC-US cervical  cytology results and in women 30 years and older with  positive high-risk HPV results. Sensitivity may be affected  by specimen collection methods, stage of infection, and the  presence of interfering substances. Results should be  interpreted in conjunction with other available laboratory  and clinical data.      Narrative    Clinical Information:->01 Routine Check-up  Clinical Information:->06 Total Hysterectomy   PATHOLOGY GYN SPECIMEN    Narrative    [facility]    DEPARTMENT OF PATHOLOGY                   [address] [place] [address]  [address]              Phone [phone]          Fax [phone]   Jimmie Lala M.D.     Moraima Schultz M.D.     Paula Laughlin M.D.   Moraima Isaac M.D.     Brock Worthy M.D.     Kishor Delacruz M.D.  Patient:    RIMA OPPE                 Specimen    [id number]                                           #:      Copies to:                         CERVICOVAGINAL CYTOLOGY " REPORT      INTERPRETATION:  NEGATIVE FOR INTRAEPITHELIAL LESION OR MALIGNANCY.    SPECIMEN ADEQUACY:  Satisfactory for Evaluation.  Endocervical cells/transformation zone  component present.                                        Report electronically signed by:                                      GONSALO GILL(ASCP)  ------------------------------------------------------------------------    Z01.419  This liquid based ThinPrep Pap test is screened with the use of an  image guided system.  Cervicovaginal Papanicolaou (Pap) smears are  screening tests with a well-documented false-negative rate.  A single  negative test is significantly less accurate than multiple negative  tests in successive screening periods.                                   RELATED LABORATORY RESULTS      Ordered by: RIOS Ord Date: 06/28/2018 Ord Time: 13:06  Test Name        Collected  Result          Abnor  Range     Units                    Date                      mal    Specimen Source  06/27/201  Cervical                   8    HPV Genotype 16  06/27/201  Negative               Negative                   8    HPV Genotype 18  06/27/201  Negative               Negative                   8    HPV Other High   06/27/201  Negative               Negative  Risk Genotypes   8        Report electronically signed by:  GONSALO GILL(ASCP)  Diagnosis performed at: [facility]  Pathology  Department, [address] [place] [address]  [id number]      Printed 00/00/0000 [id number] NATALIE GARCIA   Page 1 of 1 MRN#:[id number]       If you have any questions or concerns, please don't hesitate to call.        Sincerely,      MEDHAT Orr    Electronically Signed

## 2018-07-03 NOTE — TELEPHONE ENCOUNTER
----- Message from MEDHAT Orr sent at 7/2/2018  7:15 PM PDT -----  Please notify patient that her PAP results are back and there is no evidence of infection or malignancy.  Recommend repeat testing in 2 years.  MEDHAT Orr

## 2018-07-17 ENCOUNTER — HOSPITAL ENCOUNTER (OUTPATIENT)
Dept: RADIOLOGY | Facility: MEDICAL CENTER | Age: 58
End: 2018-07-17
Attending: NURSE PRACTITIONER
Payer: OTHER GOVERNMENT

## 2018-07-17 DIAGNOSIS — Z12.39 SCREENING FOR BREAST CANCER: ICD-10-CM

## 2018-07-17 PROCEDURE — 77067 SCR MAMMO BI INCL CAD: CPT

## 2018-07-23 ENCOUNTER — TELEPHONE (OUTPATIENT)
Dept: MEDICAL GROUP | Facility: PHYSICIAN GROUP | Age: 58
End: 2018-07-23

## 2018-07-23 DIAGNOSIS — R92.8 ABNORMAL MAMMOGRAM OF LEFT BREAST: ICD-10-CM

## 2018-07-24 ENCOUNTER — HOSPITAL ENCOUNTER (OUTPATIENT)
Dept: RADIOLOGY | Facility: MEDICAL CENTER | Age: 58
End: 2018-07-24
Attending: NURSE PRACTITIONER
Payer: OTHER GOVERNMENT

## 2018-07-24 ENCOUNTER — HOSPITAL ENCOUNTER (OUTPATIENT)
Dept: RADIOLOGY | Facility: MEDICAL CENTER | Age: 58
End: 2018-07-24

## 2018-07-24 DIAGNOSIS — R92.8 ABNORMAL MAMMOGRAM OF LEFT BREAST: ICD-10-CM

## 2018-07-24 PROCEDURE — 77065 DX MAMMO INCL CAD UNI: CPT | Mod: LT

## 2018-07-24 NOTE — TELEPHONE ENCOUNTER
----- Message from MEDHAT Orr sent at 7/23/2018  5:05 PM PDT -----  Rima  The radiology report on your mammogram is back.  The radiologist noticed an area on the left breast that is slightly irregular and he wants some additional views of that area.  I will order a diagnostic mammogram of the left breast along with an ultrasound.  You can call the [facility] at [phone] to schedule.  They will get you right in.  The radiologist will read the test and talk to you right afterward with the results so you don't have to wait and worry.  You will get the results before I do!  This is a very routine thing where they cannot clearly see the margins and want to be sure.  MEDHAT Orr

## 2018-08-31 NOTE — TELEPHONE ENCOUNTER
Was the patient seen in the last year in this department? Yes LOV 06/27/18    Does patient have an active prescription for medications requested? No     Received Request Via: Pharmacy

## 2018-09-03 RX ORDER — FLUOXETINE HYDROCHLORIDE 20 MG/1
20 CAPSULE ORAL DAILY
Qty: 90 CAP | Refills: 1 | Status: SHIPPED | OUTPATIENT
Start: 2018-09-03 | End: 2019-03-26 | Stop reason: SDUPTHER

## 2018-09-03 NOTE — TELEPHONE ENCOUNTER
Requested Prescriptions     Signed Prescriptions Disp Refills   • FLUoxetine (PROZAC) 20 MG Cap 90 Cap 1     Sig: Take 1 Cap by mouth every day.     Authorizing Provider: EDITH JAY A.P.R.N.

## 2018-09-24 ENCOUNTER — OFFICE VISIT (OUTPATIENT)
Dept: MEDICAL GROUP | Facility: PHYSICIAN GROUP | Age: 58
End: 2018-09-24
Payer: OTHER GOVERNMENT

## 2018-09-24 VITALS
OXYGEN SATURATION: 96 % | HEART RATE: 68 BPM | WEIGHT: 163 LBS | DIASTOLIC BLOOD PRESSURE: 72 MMHG | TEMPERATURE: 98.8 F | BODY MASS INDEX: 32 KG/M2 | HEIGHT: 60 IN | SYSTOLIC BLOOD PRESSURE: 122 MMHG

## 2018-09-24 DIAGNOSIS — Z23 FLU VACCINE NEED: ICD-10-CM

## 2018-09-24 DIAGNOSIS — E66.9 OBESITY (BMI 30-39.9): ICD-10-CM

## 2018-09-24 DIAGNOSIS — K76.89 HEPATIC CYST: ICD-10-CM

## 2018-09-24 DIAGNOSIS — R92.8 ABNORMAL MAMMOGRAM OF LEFT BREAST: ICD-10-CM

## 2018-09-24 DIAGNOSIS — F43.21 GRIEVING: ICD-10-CM

## 2018-09-24 DIAGNOSIS — K76.89 SIMPLE HEPATIC CYST: ICD-10-CM

## 2018-09-24 PROCEDURE — 99212 OFFICE O/P EST SF 10 MIN: CPT | Mod: 25 | Performed by: NURSE PRACTITIONER

## 2018-09-24 PROCEDURE — 90686 IIV4 VACC NO PRSV 0.5 ML IM: CPT | Performed by: NURSE PRACTITIONER

## 2018-09-24 PROCEDURE — 90471 IMMUNIZATION ADMIN: CPT | Performed by: NURSE PRACTITIONER

## 2018-09-24 NOTE — PROGRESS NOTES
Chief Complaint   Patient presents with   • Follow-Up     Passing of        Subjective:   Rima Poep is a 57 y.o. female here today for evaluation and management of:    Grieving  Still seeing a grief counselor.  Taking melatonin for sleep. Sleeping 5-6 hours nightly.  Working part time.  Continues with prozac 20 mg daily with good coverage.  Feels she is doing as well as can be expected at this point.  It has been 4 months since the death of her .     Abnormal mammogram of left breast  Negative biopsies.      Hepatic cyst  Hepatic cyst: yearly followup requested ultrasound.     Obesity (BMI 30-39.9)  BMI 31.83 today.           Current medicines (including changes today)  Current Outpatient Prescriptions   Medication Sig Dispense Refill   • FLUoxetine (PROZAC) 20 MG Cap Take 1 Cap by mouth every day. 90 Cap 1   • fluticasone (FLONASE) 50 MCG/ACT nasal spray Spray 1 Spray in nose every day. 16 g 6   • rosuvastatin (CRESTOR) 10 MG Tab Take 1 Tab by mouth every evening. 90 Tab 2     No current facility-administered medications for this visit.      She  has a past medical history of Allergic rhinitis; Anxiety; Hyperlipidemia; and Nasal drainage.    ROS as stated in hpi  No chest pain, no shortness of breath, no abdominal pain       Objective:     Blood pressure 122/72, pulse 68, temperature 37.1 °C (98.8 °F), height 1.524 m (5'), weight 73.9 kg (163 lb), last menstrual period 09/24/1999, SpO2 96 %. Body mass index is 31.83 kg/m².   Physical Exam:  Constitutional: Alert, no distress.  Skin: Warm, dry, good turgor,no cyanosis, no rashes in visible areas.  Eye: Equal, round and reactive, conjunctiva clear, lids normal.  Ears: No tenderness, no discharge.  External canals are without any significant edema or erythema. Gross auditory acuity is intact.  Nose: symmetrical without tenderness, no discharge.  Mouth/Throat: lips without lesion.  Oropharynx clear.  Neck: Trachea midline, no masses, no obvious thyroid  enlargement..Range of motion within normal limits.  Neuro: Cranial nerves 2-12 grossly intact.  No sensory deficit.  Respiratory: Unlabored respiratory effort, lungs clear to auscultation, no wheezes, no ronchi.  Cardiovascular: Normal S1, S2, no murmur, no edema.  Abdomen: Soft, non-tender, no masses, no guarding,  no hepatosplenomegaly.  Psych: Alert and oriented x3, normal affect and mood and judgement. Tearful, but feels she is doing as well as could be expected.  Currently seeing a therapist      Assessment and Plan:   The following treatment plan was discussed    1. Flu vaccine need  Needs flu vaccine.  No acute illness. Consent obtained.   I have placed the below orders and discussed them with an approved delegating provider.  The MA is performing the below orders under the direction of dr. Kraig MD.    - Influenza Vaccine Quad Injection >3Y (PF)    2. Grieving  Chronic, ongoing. Stable.  Continue with good rest/exercise/healthy food.  Recommended a daily vitamin with vitamin D.  Continue with counselor.  Monitor and follow. Continue with prozac.     3. Abnormal mammogram of left breast  Resolved.  All biopsies were negative.     4. Simple hepatic cyst  Chronic, ongoing.  Will order yearly ultrasound to follow hepatic cysts.  Monitor and follow.   - US-RUQ; Future    5. Hepatic cyst  See #4    6. Obesity (BMI 30-39.9)  Chronic, onoing. Stable.  Monitor and follow.  Recommended daily walking.       Followup: No Follow-up on file.         Educated in proper administration of medication(s) ordered today including safety, possible SE, risks, benefits, rationale and alternatives to therapy.     Please note that this dictation was created using voice recognition software. I have made every reasonable attempt to correct obvious errors, but I expect that there are errors of grammar and possibly content that I did not discover before finalizing the note.

## 2018-09-24 NOTE — ASSESSMENT & PLAN NOTE
Still seeing a grief counselor.  Taking melatonin for sleep. Sleeping 5-6 hours nightly.  Working part time.  Continues with prozac 20 mg daily with good coverage.  Feels she is doing as well as can be expected at this point.  It has been 4 months since the death of her .

## 2018-09-30 RX ORDER — FLUTICASONE PROPIONATE 50 MCG
SPRAY, SUSPENSION (ML) NASAL
Qty: 3 BOTTLE | Refills: 3 | Status: SHIPPED | OUTPATIENT
Start: 2018-09-30 | End: 2019-04-01 | Stop reason: SDUPTHER

## 2018-10-01 NOTE — TELEPHONE ENCOUNTER
Requested Prescriptions     Signed Prescriptions Disp Refills   • fluticasone (FLONASE) 50 MCG/ACT nasal spray 3 Bottle 3     Sig: SHAKE LIQUID AND USE 1 SPRAY IN EACH NOSTRIL EVERY DAY     Authorizing Provider: EDITH JAY A.P.R.N.

## 2018-10-22 ENCOUNTER — APPOINTMENT (OUTPATIENT)
Dept: RADIOLOGY | Facility: MEDICAL CENTER | Age: 58
End: 2018-10-22
Attending: NURSE PRACTITIONER
Payer: OTHER GOVERNMENT

## 2018-11-09 ENCOUNTER — APPOINTMENT (OUTPATIENT)
Dept: RADIOLOGY | Facility: MEDICAL CENTER | Age: 58
End: 2018-11-09
Attending: NURSE PRACTITIONER
Payer: OTHER GOVERNMENT

## 2018-11-15 ENCOUNTER — HOSPITAL ENCOUNTER (OUTPATIENT)
Dept: RADIOLOGY | Facility: MEDICAL CENTER | Age: 58
End: 2018-11-15
Attending: NURSE PRACTITIONER
Payer: OTHER GOVERNMENT

## 2018-11-15 DIAGNOSIS — K76.89 SIMPLE HEPATIC CYST: ICD-10-CM

## 2018-11-15 PROCEDURE — 76705 ECHO EXAM OF ABDOMEN: CPT

## 2018-12-04 RX ORDER — ROSUVASTATIN CALCIUM 10 MG/1
TABLET, COATED ORAL
Qty: 90 TAB | Refills: 2 | Status: SHIPPED | OUTPATIENT
Start: 2018-12-04 | End: 2020-05-15 | Stop reason: SDUPTHER

## 2018-12-04 NOTE — TELEPHONE ENCOUNTER
Requested Prescriptions     Signed Prescriptions Disp Refills   • rosuvastatin (CRESTOR) 10 MG Tab 90 Tab 2     Sig: TAKE 1 TABLET BY MOUTH EVERY EVENING     Authorizing Provider: EDITH JAY A.P.R.N.

## 2018-12-17 ENCOUNTER — APPOINTMENT (OUTPATIENT)
Dept: SLEEP MEDICINE | Facility: MEDICAL CENTER | Age: 58
End: 2018-12-17
Payer: OTHER GOVERNMENT

## 2019-01-22 ENCOUNTER — OFFICE VISIT (OUTPATIENT)
Dept: MEDICAL GROUP | Facility: PHYSICIAN GROUP | Age: 59
End: 2019-01-22
Payer: OTHER GOVERNMENT

## 2019-01-22 VITALS
TEMPERATURE: 99 F | HEIGHT: 60 IN | OXYGEN SATURATION: 96 % | HEART RATE: 78 BPM | SYSTOLIC BLOOD PRESSURE: 138 MMHG | RESPIRATION RATE: 16 BRPM | DIASTOLIC BLOOD PRESSURE: 108 MMHG | BODY MASS INDEX: 32.98 KG/M2 | WEIGHT: 168 LBS

## 2019-01-22 DIAGNOSIS — F43.21 GRIEVING: ICD-10-CM

## 2019-01-22 DIAGNOSIS — M67.40 GANGLION CYST: ICD-10-CM

## 2019-01-22 DIAGNOSIS — F33.2 SEVERE EPISODE OF RECURRENT MAJOR DEPRESSIVE DISORDER, WITHOUT PSYCHOTIC FEATURES (HCC): ICD-10-CM

## 2019-01-22 PROBLEM — F33.9 EPISODE OF RECURRENT MAJOR DEPRESSIVE DISORDER (HCC): Status: ACTIVE | Noted: 2017-08-23

## 2019-01-22 PROCEDURE — 99214 OFFICE O/P EST MOD 30 MIN: CPT | Performed by: NURSE PRACTITIONER

## 2019-01-22 RX ORDER — FLUOXETINE 10 MG/1
CAPSULE ORAL
Qty: 90 CAP | Refills: 0 | Status: SHIPPED | OUTPATIENT
Start: 2019-01-22 | End: 2019-03-26

## 2019-01-22 RX ORDER — FLUOXETINE 10 MG/1
10 CAPSULE ORAL DAILY
Qty: 30 CAP | Refills: 6 | Status: SHIPPED | OUTPATIENT
Start: 2019-01-22 | End: 2019-01-22 | Stop reason: SDUPTHER

## 2019-01-22 NOTE — PROGRESS NOTES
Chief Complaint   Patient presents with   • Anxiety   • Insomnia   • Cyst     right middle finger, left lower leg        Subjective:   Rima Pope is a 58 y.o. female here today for evaluation and management of:    Grieving  Taking prozac 20 mg and doing counseling.  Having lots of breakthrough anxiety.     Ganglion cyst  Reports ganglion cyston 3rd finger in left hand and one on left lower leg along fibula.     Episode of recurrent major depressive disorder (HCC)  Patient lost  suddenly past summer.  Holidays has triggered increased depression and anxiety.  Often staying in bed all day.  Working as a .  Avoiding self care.  Prozac 20 mg.  Denies suicidal ideation/ plan.  Tearful.          Current medicines (including changes today)  Current Outpatient Prescriptions   Medication Sig Dispense Refill   • FLUoxetine (PROZAC) 10 MG Cap Take 1 Cap by mouth every day. 30 Cap 6   • rosuvastatin (CRESTOR) 10 MG Tab TAKE 1 TABLET BY MOUTH EVERY EVENING 90 Tab 2   • FLUoxetine (PROZAC) 20 MG Cap Take 1 Cap by mouth every day. 90 Cap 1   • fluticasone (FLONASE) 50 MCG/ACT nasal spray SHAKE LIQUID AND USE 1 SPRAY IN EACH NOSTRIL EVERY DAY 3 Bottle 3     No current facility-administered medications for this visit.      She  has a past medical history of Allergic rhinitis; Anxiety; Hyperlipidemia; and Nasal drainage.    ROS as stated in hpi  No chest pain, no shortness of breath, no abdominal pain       Objective:     Blood pressure 138/108, pulse 78, temperature 37.2 °C (99 °F), temperature source Temporal, resp. rate 16, height 1.524 m (5'), weight 76.2 kg (168 lb), SpO2 96 %. Body mass index is 32.81 kg/m².   Physical Exam:  Constitutional: Alert, no distress.  Skin: Warm, dry, good turgor,no cyanosis, no rashes in visible areas.  Eye: Equal, round and reactive, conjunctiva clear, lids normal.  Ears: No tenderness, no discharge.  External canals are without any significant edema or erythema.  Gross auditory  acuity is intact.  Nose: symmetrical without tenderness, no discharge.  Mouth/Throat: lips without lesion.  Oropharynx clear.    Neck: Trachea midline, no masses, no obvious thyroid enlargement.. . Range of motion within normal limits.  Neuro: Cranial nerves 2-12 grossly intact.  No sensory deficit.  Respiratory: Unlabored respiratory effort,   Cardiovascular: Normal S1, S2, no murmur, no edema.  Abdomen: Soft, non-tender, no masses, no guarding,  no hepatosplenomegaly.  Psych: Alert and oriented x3, normal affect and mood and judgement.  Tearful and sobbing with loss of .         Assessment and Plan:   The following treatment plan was discussed    1. Grieving  See #1    2. Ganglion cyst  Chronic, ongoing, new cysts have been discovered on right third finger and left lower leg.  Ganglion cysts.  Reassured patient.  Monitor for growth, pain or redness. Discussed possible causes and treatments.   Monitor and follow.     3. Severe episode of recurrent major depressive disorder, without psychotic features (HCC)  This is a new ongoing problem with acute exacerbation.  Increase Prozac to 30 mg daily.  Continue with bi-weekly counseling.  Discussed small self care, such as a daily walk for fresh air.  Discussed being open about her grieving with her children so they can normalize their own grief.  Patient to send me a my chart message in 2-3 weeks to report on increase dose of med.  Return in 3 months.  ED precautions reviewed.  Monitor and follow closely.       Followup: Return in about 3 months (around 4/22/2019) for depression.         Educated in proper administration of medication(s) ordered today including safety, possible SE, risks, benefits, rationale and alternatives to therapy.     Please note that this dictation was created using voice recognition software. I have made every reasonable attempt to correct obvious errors, but I expect that there are errors of grammar and possibly content that I did not  discover before finalizing the note.

## 2019-01-22 NOTE — ASSESSMENT & PLAN NOTE
Patient lost  suddenly past summer.  Holidays has triggered increased depression and anxiety.  Often staying in bed all day.  Working as a .  Avoiding self care.  Prozac 20 mg.  Denies suicidal ideation/ plan.  Tearful.

## 2019-01-22 NOTE — TELEPHONE ENCOUNTER
Was the patient seen in the last year in this department? Yes    Does patient have an active prescription for medications requested? Yes  Pharmacy is requesting a 90 day supply    Received Request Via: Pharmacy

## 2019-01-23 NOTE — TELEPHONE ENCOUNTER
Requested Prescriptions     Signed Prescriptions Disp Refills   • FLUoxetine (PROZAC) 10 MG Cap 90 Cap 0     Sig: TAKE ONE CAPSULE BY MOUTH EVERY DAY     Authorizing Provider: EDITH JAY A.P.R.N.

## 2019-03-21 ENCOUNTER — SLEEP CENTER VISIT (OUTPATIENT)
Dept: SLEEP MEDICINE | Facility: MEDICAL CENTER | Age: 59
End: 2019-03-21
Payer: OTHER GOVERNMENT

## 2019-03-21 VITALS
DIASTOLIC BLOOD PRESSURE: 82 MMHG | RESPIRATION RATE: 15 BRPM | OXYGEN SATURATION: 95 % | BODY MASS INDEX: 32 KG/M2 | HEART RATE: 71 BPM | SYSTOLIC BLOOD PRESSURE: 122 MMHG | WEIGHT: 163 LBS | HEIGHT: 60 IN

## 2019-03-21 DIAGNOSIS — G47.33 OSA (OBSTRUCTIVE SLEEP APNEA): ICD-10-CM

## 2019-03-21 PROCEDURE — 99213 OFFICE O/P EST LOW 20 MIN: CPT | Performed by: FAMILY MEDICINE

## 2019-03-21 NOTE — PROGRESS NOTES
[facility] Follow Up Note     Date: 3/21/2019 / Time: 1:03 PM    Patient ID:   Name:             Rima Pope     YOB: 1960  Age:                 58 y.o.  female   MRN:               [id number]      Thank you for requesting a sleep medicine consultation on Rima Pope at the sleep center. She presents today with the chief complaints of MAXX follow up.     HISTORY OF PRESENT ILLNESS:       Pt is currently on CPAP 9 cm however she has not been using it for last couple months.  She goes to sleep around 12 am and wakes up around 8-9 am. She is getting about 7 hrs of sleep on a good night and about 4 hr of sleep on a bad night. The bad nights are about 1-2 per week. Overall,  She doesnot finds her sleep refreshing. When She  wakes up in the morning, She does* feel tired. She does not take regular naps.The symptoms of excessive daytime, snoring has continued.     Since her last visit she had split night study which showed  Moderate obstructive sleep apnea with AHI of 15.1/hr. Due to severity of the disease he met the split study protocol. It was a successful titration. The titration started with CPAP 5 cm and the best tolerated was 9 cm. The AHI improved to 1.9/hr with improved O2 pool of 91% and average O2 saturation of 94 %.         REVIEW OF SYSTEMS:       Constitutional: Denies fevers, Denies weight changes  Eyes: Denies changes in vision, no eye pain  Ears/Nose/Throat/Mouth: Denies nasal congestion or sore throat   Cardiovascular: Denies chest pain or palpitations   Respiratory: Denies shortness of breath , Denies cough  Gastrointestinal/Hepatic: Denies abdominal pain, nausea, vomiting, diarrhea, constipation or GI bleeding   Genitourinary: Deniesdysuria or frequency  Musculoskeletal/Rheum: Denies  joint pain and swelling   Skin/Breast: Denies rash,   Neurological: Denies headache, confusion, memory loss or focal weakness/parasthesias  Psychiatric: + depression, stable on  medication   Sleep: + snoring     Comprehensive review of systems form is reviewed with the patient and is attached in the EMR.     PMH:  has a past medical history of Allergic rhinitis; Anxiety; Hyperlipidemia; and Nasal drainage.  MEDS:   Current Outpatient Prescriptions:   •  FLUoxetine (PROZAC) 10 MG Cap, TAKE ONE CAPSULE BY MOUTH EVERY DAY, Disp: 90 Cap, Rfl: 0  •  rosuvastatin (CRESTOR) 10 MG Tab, TAKE 1 TABLET BY MOUTH EVERY EVENING, Disp: 90 Tab, Rfl: 2  •  fluticasone (FLONASE) 50 MCG/ACT nasal spray, SHAKE LIQUID AND USE 1 SPRAY IN EACH NOSTRIL EVERY DAY, Disp: 3 Bottle, Rfl: 3  •  FLUoxetine (PROZAC) 20 MG Cap, Take 1 Cap by mouth every day., Disp: 90 Cap, Rfl: 1  ALLERGIES:   Allergies   Allergen Reactions   • Sulfa Drugs      SURGHX:   Past Surgical History:   Procedure Laterality Date   • ABDOMINAL HYSTERECTOMY TOTAL     • HERNIA REPAIR     • HYSTERECTOMY LAPAROSCOPY     • PRIMARY C SECTION       SOCHX:  reports that she has never smoked. She has never used smokeless tobacco. She reports that she drinks about 1.2 oz of alcohol per week . She reports that she does not use drugs..  FH:   Family History   Problem Relation Age of Onset   • Cancer Mother         lung, bladder, melanoma   • Hyperlipidemia Mother    • Alcohol/Drug Father    • Alcohol/Drug Sister    • Alcohol/Drug Brother    • Alcohol/Drug Brother    • No Known Problems Son    • No Known Problems Daughter          Physical Exam:  Vitals/ General Appearance:   Weight/BMI: Body mass index is 31.83 kg/m².  Blood pressure 122/82, pulse 71, resp. rate 15, height 1.524 m (5'), weight 73.9 kg (163 lb), SpO2 95 %.  Vitals:    03/21/19 1259   BP: 122/82   BP Location: Right arm   Patient Position: Sitting   BP Cuff Size: Adult   Pulse: 71   Resp: 15   SpO2: 95%   Weight: 73.9 kg (163 lb)   Height: 1.524 m (5')       Pt. is alert and oriented to time, place and person. Cooperative and in no apparent distress.       1. Head: Atraumatic, normocephalic.    2. Ears: Normal tympanic membrane and no discharge  3. Nose: No inferior turbinate hypertophy, no septal deviation, no polyp.   4. Throat: Oropharynx appears crowded in that the palate is overhanging   5. Neck: Supple. No thyromegaly  6. Chest: Trachea central, no spine deformity   7. Lungs auscultation: B/L good air entry, vesicular breath sounds, no adventitious sounds  8. Heart auscultation: 1st and 2nd heart sounds normal, regular rhythm. No appreciable murmur.  9. . Extremities: no clubbing, no pedal edema.  10. Skin: No rash  11. NEUROLOGICAL EXAMINATION: On neurological exam, the patient was alert and oriented x3. speech was clear and fluent without dysarthria.      ASSESSMENT AND PLAN     1. Sleep Apnea     The pathophysiology of sleep anea and the increased risk of cardiovascular morbidity from untreated sleep apnea is discussed in detail with the patient.      She is urged to avoid supine sleep, weight gain and alcoholic beverages since all of these can worsen sleep apnea. She is cautioned against drowsy driving. If She feels sleepy while driving, She must pull over for a break/nap, rather than persist on the road, in the interest of She own safety and that of others on the road.   Plan   - she has an old CPAP however settings were adjusted on her late husbands devastation to ACPAP 7-9 cm   - Supplies and new SD is ordered today . Mask fitting was done and she chose N30i    - compliance download was reviewed and discussed with the pt   - compliance was reinforced     2. Regarding treatment of other past medical problems and general health maintenance,  She is urged to follow up with PCP.

## 2019-03-25 ENCOUNTER — APPOINTMENT (OUTPATIENT)
Dept: MEDICAL GROUP | Facility: PHYSICIAN GROUP | Age: 59
End: 2019-03-25
Payer: OTHER GOVERNMENT

## 2019-03-26 RX ORDER — FLUOXETINE HYDROCHLORIDE 20 MG/1
CAPSULE ORAL
Qty: 90 CAP | Refills: 3 | Status: SHIPPED | OUTPATIENT
Start: 2019-03-26 | End: 2019-04-01 | Stop reason: SDUPTHER

## 2019-03-26 NOTE — TELEPHONE ENCOUNTER
Requested Prescriptions     Signed Prescriptions Disp Refills   • FLUoxetine (PROZAC) 20 MG Cap 90 Cap 3     Sig: TAKE 1 CAPSULE BY MOUTH EVERY DAY     Authorizing Provider: EDITH JAY A.P.R.N.

## 2019-04-01 ENCOUNTER — OFFICE VISIT (OUTPATIENT)
Dept: MEDICAL GROUP | Facility: PHYSICIAN GROUP | Age: 59
End: 2019-04-01
Payer: OTHER GOVERNMENT

## 2019-04-01 VITALS
SYSTOLIC BLOOD PRESSURE: 126 MMHG | TEMPERATURE: 97.7 F | WEIGHT: 167 LBS | OXYGEN SATURATION: 94 % | RESPIRATION RATE: 14 BRPM | BODY MASS INDEX: 32.79 KG/M2 | HEART RATE: 71 BPM | DIASTOLIC BLOOD PRESSURE: 80 MMHG | HEIGHT: 60 IN

## 2019-04-01 DIAGNOSIS — Z23 NEED FOR VACCINATION: ICD-10-CM

## 2019-04-01 DIAGNOSIS — E78.2 MIXED HYPERLIPIDEMIA: ICD-10-CM

## 2019-04-01 DIAGNOSIS — F33.2 SEVERE EPISODE OF RECURRENT MAJOR DEPRESSIVE DISORDER, WITHOUT PSYCHOTIC FEATURES (HCC): ICD-10-CM

## 2019-04-01 DIAGNOSIS — Z12.11 ENCOUNTER FOR SCREENING FECAL OCCULT BLOOD TESTING: ICD-10-CM

## 2019-04-01 DIAGNOSIS — E66.9 OBESITY (BMI 30-39.9): ICD-10-CM

## 2019-04-01 PROCEDURE — 90471 IMMUNIZATION ADMIN: CPT | Performed by: NURSE PRACTITIONER

## 2019-04-01 PROCEDURE — 99214 OFFICE O/P EST MOD 30 MIN: CPT | Mod: 25 | Performed by: NURSE PRACTITIONER

## 2019-04-01 PROCEDURE — 90750 HZV VACC RECOMBINANT IM: CPT | Performed by: NURSE PRACTITIONER

## 2019-04-01 RX ORDER — FLUOXETINE HYDROCHLORIDE 20 MG/1
20 CAPSULE ORAL
Qty: 90 CAP | Refills: 3 | Status: SHIPPED | OUTPATIENT
Start: 2019-04-01 | End: 2020-05-26 | Stop reason: SDUPTHER

## 2019-04-01 RX ORDER — FLUOXETINE 10 MG/1
10 CAPSULE ORAL DAILY
Qty: 90 CAP | Refills: 3 | Status: SHIPPED | OUTPATIENT
Start: 2019-04-01 | End: 2020-05-26 | Stop reason: SDUPTHER

## 2019-04-01 RX ORDER — CIPROFLOXACIN 500 MG/1
500 TABLET, FILM COATED ORAL 2 TIMES DAILY
Qty: 14 TAB | Refills: 0 | Status: SHIPPED | OUTPATIENT
Start: 2019-04-01 | End: 2020-05-26

## 2019-04-01 RX ORDER — FLUTICASONE PROPIONATE 50 MCG
SPRAY, SUSPENSION (ML) NASAL
Qty: 3 BOTTLE | Refills: 3 | Status: SHIPPED | OUTPATIENT
Start: 2019-04-01 | End: 2020-05-26 | Stop reason: SDUPTHER

## 2019-04-01 NOTE — PROGRESS NOTES
Chief Complaint   Patient presents with   • Follow-Up   • Depression       Subjective:   Rima Pope is a 58 y.o. female here today for evaluation and management of:    Episode of recurrent major depressive disorder (HCC)  Feels that she is feeling better at the 30 mg level.  Hiking, skiing and not feeling so depressed.  Feels that her health focused anxiety has decreased. Anniversary of husbands death is coming up in May  Has upcoming trip to [facility]    Obesity (BMI 30-39.9)  BMI 32.61.  Reports skiing weekly and hiking.     Mixed hyperlipidemia  Continues with crestor 10 mg daily.  Labs WNL.            Current medicines (including changes today)  Current Outpatient Prescriptions   Medication Sig Dispense Refill   • fluticasone (FLONASE) 50 MCG/ACT nasal spray SHAKE LIQUID AND USE 1 SPRAY IN EACH NOSTRIL EVERY DAY 3 Bottle 3   • FLUoxetine (PROZAC) 20 MG Cap Take 1 Cap by mouth every day. 90 Cap 3   • FLUoxetine (PROZAC) 10 MG Cap Take 1 Cap by mouth every day. 90 Cap 3   • ciprofloxacin (CIPRO) 500 MG Tab Take 1 Tab by mouth 2 times a day. 14 Tab 0   • rosuvastatin (CRESTOR) 10 MG Tab TAKE 1 TABLET BY MOUTH EVERY EVENING 90 Tab 2     No current facility-administered medications for this visit.      She  has a past medical history of Allergic rhinitis; Anxiety; Hyperlipidemia; and Nasal drainage.    ROS as stated in hpi  No chest pain, no shortness of breath, no abdominal pain       Objective:     Blood pressure 126/80, pulse 71, temperature 36.5 °C (97.7 °F), temperature source Temporal, resp. rate 14, height 1.524 m (5'), weight 75.8 kg (167 lb), SpO2 94 %. Body mass index is 32.61 kg/m². stable.   Physical Exam:  Constitutional: Alert, no distress.  Skin: Warm, dry, good turgor,no cyanosis, no rashes in visible areas.  Eye: Equal, round and reactive, conjunctiva clear, lids normal.  Ears: No tenderness, no discharge.  External canals are without any significant edema or erythema.  Tympanic membranes are without  any inflammation, no effusion.  Gross auditory acuity is intact.  Nose: symmetrical without tenderness, no discharge.  Mouth/Throat: lips without lesion.  Oropharynx clear.  Throat without erythema, exudates or tonsillar enlargement.  Neck: Trachea midline, no masses, no obvious thyroid enlargement.. No cervical or supraclavicular lymphadenopathy. Range of motion within normal limits.  Neuro: Cranial nerves 2-12 grossly intact.  No sensory deficit.  Respiratory: Unlabored respiratory effort, lungs clear to auscultation, no wheezes, no ronchi.  Cardiovascular: Normal S1, S2, no murmur, no edema.  Abdomen: Soft, non-tender, no masses, no guarding,  no hepatosplenomegaly.  Psych: Alert and oriented x3, normal affect and mood and judgement.        Assessment and Plan:   The following treatment plan was discussed    1. Severe episode of recurrent major depressive disorder, without psychotic features (HCC)  Chronic, ongoing. Improving on 30 mg prozac.  Socializing and exercising and doing family therapy to help with her grief.  Has international trip planned in 2 weeks.  Coming up on one year anniversary of husbands death.  Refills today    2. Encounter for screening fecal occult blood testing  Due for FIT. , kit given.  Monitor and follow.   - OCCULT BLOOD FECES IMMUNOASSAY; Future    3. Need for vaccination  Due for vaccine.  No acute illness  I have placed the below orders and discussed them with an approved delegating provider.  The MA is performing the below orders under the direction of Dr. Belle.    - Shingles Vaccine (Shingrix)    4. Obesity (BMI 30-39.9)  Chronic, ongoing. Continue hiking and skiing.  Monitor and follow.     5. Mixed hyperlipidemia  Chronic, ongoing. Stable.  Continue with crestor.        Followup: Return in about 6 months (around 10/1/2019) for well woman.         Educated in proper administration of medication(s) ordered today including safety, possible SE, risks, benefits, rationale and  alternatives to therapy.     Please note that this dictation was created using voice recognition software. I have made every reasonable attempt to correct obvious errors, but I expect that there are errors of grammar and possibly content that I did not discover before finalizing the note.

## 2019-04-01 NOTE — ASSESSMENT & PLAN NOTE
Feels that she is feeling better at the 30 mg level.  Hiking, skiing and not feeling so depressed.  Feels that her health focused anxiety has decreased. Anniversary of husbands death is coming up in May  Has upcoming trip to [facility]

## 2019-05-09 ENCOUNTER — APPOINTMENT (OUTPATIENT)
Dept: SLEEP MEDICINE | Facility: MEDICAL CENTER | Age: 59
End: 2019-05-09
Payer: OTHER GOVERNMENT

## 2019-08-06 ENCOUNTER — HOSPITAL ENCOUNTER (OUTPATIENT)
Dept: RADIOLOGY | Facility: MEDICAL CENTER | Age: 59
End: 2019-08-06
Attending: NURSE PRACTITIONER
Payer: OTHER GOVERNMENT

## 2019-08-06 DIAGNOSIS — Z12.31 VISIT FOR SCREENING MAMMOGRAM: ICD-10-CM

## 2019-08-06 PROCEDURE — 77063 BREAST TOMOSYNTHESIS BI: CPT

## 2019-08-07 ENCOUNTER — TELEPHONE (OUTPATIENT)
Dept: MEDICAL GROUP | Facility: PHYSICIAN GROUP | Age: 59
End: 2019-08-07

## 2019-08-07 NOTE — LETTER
August 7, 2019         Rima Pope  [address]  [place] [address]        Dear Rima:      Below are the results from your recent visit:    Please notify patient that her mammogram results have been read.  There was no evidence of malignancy or suspicious areas of concern.   Recommend yearly follow up screening.   MEDHAT Orr     Resulted Orders   MA-SCREENING MAMMO BILAT W/TOMOSYNTHESIS W/CAD    Narrative    8/6/2019 4:11 PM    HISTORY/REASON FOR EXAM:  Routine Mammographic Screening.      TECHNIQUE/EXAM DESCRIPTION:  Bilateral tomosynthesis screening mammography was performed with standard mammographic images generated from the data set. Images were reviewed and interpreted with CAD.    COMPARISON:   7/17/2018    FINDINGS:    There are scattered areas of fibroglandular density.  There is no dominant mass, suspicious calcification, or any secondary malignant sign. Benign-appearing calcifications are present.      Impression    1.  Breasts have scattered areas of fibroglandular density, with no radiographic evidence of malignancy and no interval change.    2.  Screening mammogram in one year is recommended.      R2 - Category 2:  Benign Finding(s)     If you have any questions or concerns, please don't hesitate to call.    Electronically Signed

## 2019-08-07 NOTE — TELEPHONE ENCOUNTER
----- Message from MEDHAT Orr sent at 8/6/2019  5:35 PM PDT -----  Please notify patient that her mammogram results have been read.  There was no evidence of malignancy or suspicious areas of concern.   Recommend yearly follow up screening.  MEDHAT Orr

## 2020-05-15 DIAGNOSIS — E78.2 MIXED HYPERLIPIDEMIA: ICD-10-CM

## 2020-05-15 RX ORDER — ROSUVASTATIN CALCIUM 10 MG/1
TABLET, COATED ORAL
Qty: 90 TAB | Refills: 0 | Status: SHIPPED | OUTPATIENT
Start: 2020-05-15 | End: 2020-05-26 | Stop reason: SDUPTHER

## 2020-05-15 NOTE — TELEPHONE ENCOUNTER
Received request via: Pharmacy    Was the patient seen in the last year in this department? No  LOV 04/01/2019 LABS 06/20/2018    Does the patient have an active prescription (recently filled or refills available) for medication(s) requested? No

## 2020-05-15 NOTE — TELEPHONE ENCOUNTER
Requested Prescriptions     Signed Prescriptions Disp Refills   • rosuvastatin (CRESTOR) 10 MG Tab 90 Tab 0     Sig: TAKE 1 TABLET BY MOUTH EVERY EVENING     Authorizing Provider: CHERYL BURTON A.P.R.N.

## 2020-05-26 ENCOUNTER — OFFICE VISIT (OUTPATIENT)
Dept: MEDICAL GROUP | Facility: PHYSICIAN GROUP | Age: 60
End: 2020-05-26
Payer: OTHER GOVERNMENT

## 2020-05-26 ENCOUNTER — HOSPITAL ENCOUNTER (OUTPATIENT)
Facility: MEDICAL CENTER | Age: 60
End: 2020-05-26
Attending: NURSE PRACTITIONER
Payer: OTHER GOVERNMENT

## 2020-05-26 VITALS
BODY MASS INDEX: 33.2 KG/M2 | WEIGHT: 170 LBS | OXYGEN SATURATION: 100 % | SYSTOLIC BLOOD PRESSURE: 118 MMHG | TEMPERATURE: 97.9 F | RESPIRATION RATE: 16 BRPM | HEART RATE: 84 BPM | DIASTOLIC BLOOD PRESSURE: 78 MMHG

## 2020-05-26 DIAGNOSIS — Z12.72 SCREENING FOR VAGINAL CANCER: ICD-10-CM

## 2020-05-26 DIAGNOSIS — E78.2 MIXED HYPERLIPIDEMIA: ICD-10-CM

## 2020-05-26 DIAGNOSIS — F33.2 SEVERE EPISODE OF RECURRENT MAJOR DEPRESSIVE DISORDER, WITHOUT PSYCHOTIC FEATURES (HCC): ICD-10-CM

## 2020-05-26 DIAGNOSIS — Z01.419 WELL WOMAN EXAM WITH ROUTINE GYNECOLOGICAL EXAM: ICD-10-CM

## 2020-05-26 DIAGNOSIS — K76.89 HEPATIC CYST: ICD-10-CM

## 2020-05-26 PROCEDURE — 87624 HPV HI-RISK TYP POOLED RSLT: CPT

## 2020-05-26 PROCEDURE — 88175 CYTOPATH C/V AUTO FLUID REDO: CPT

## 2020-05-26 PROCEDURE — 99000 SPECIMEN HANDLING OFFICE-LAB: CPT | Performed by: NURSE PRACTITIONER

## 2020-05-26 PROCEDURE — 99386 PREV VISIT NEW AGE 40-64: CPT | Performed by: NURSE PRACTITIONER

## 2020-05-26 RX ORDER — FLUOXETINE HYDROCHLORIDE 20 MG/1
20 CAPSULE ORAL
Qty: 90 CAP | Refills: 3 | Status: SHIPPED | OUTPATIENT
Start: 2020-05-26 | End: 2021-06-21 | Stop reason: SDUPTHER

## 2020-05-26 RX ORDER — ROSUVASTATIN CALCIUM 10 MG/1
TABLET, COATED ORAL
Qty: 90 TAB | Refills: 3 | Status: SHIPPED | OUTPATIENT
Start: 2020-05-26 | End: 2020-08-17 | Stop reason: SDUPTHER

## 2020-05-26 RX ORDER — FLUTICASONE PROPIONATE 50 MCG
SPRAY, SUSPENSION (ML) NASAL
Qty: 3 BOTTLE | Refills: 3 | Status: SHIPPED | OUTPATIENT
Start: 2020-05-26 | End: 2021-06-02 | Stop reason: SDUPTHER

## 2020-05-26 RX ORDER — FLUOXETINE 10 MG/1
10 CAPSULE ORAL DAILY
Qty: 90 CAP | Refills: 3 | Status: SHIPPED | OUTPATIENT
Start: 2020-05-26 | End: 2021-06-21 | Stop reason: SDUPTHER

## 2020-05-26 ASSESSMENT — PATIENT HEALTH QUESTIONNAIRE - PHQ9
SUM OF ALL RESPONSES TO PHQ QUESTIONS 1-9: 0
3. TROUBLE FALLING OR STAYING ASLEEP OR SLEEPING TOO MUCH: NOT AT ALL
7. TROUBLE CONCENTRATING ON THINGS, SUCH AS READING THE NEWSPAPER OR WATCHING TELEVISION: NOT AT ALL
5. POOR APPETITE OR OVEREATING: NOT AT ALL
SUM OF ALL RESPONSES TO PHQ9 QUESTIONS 1 AND 2: 0
2. FEELING DOWN, DEPRESSED, IRRITABLE, OR HOPELESS: NOT AT ALL
9. THOUGHTS THAT YOU WOULD BE BETTER OFF DEAD, OR OF HURTING YOURSELF: NOT AT ALL
6. FEELING BAD ABOUT YOURSELF - OR THAT YOU ARE A FAILURE OR HAVE LET YOURSELF OR YOUR FAMILY DOWN: NOT AL ALL
8. MOVING OR SPEAKING SO SLOWLY THAT OTHER PEOPLE COULD HAVE NOTICED. OR THE OPPOSITE, BEING SO FIGETY OR RESTLESS THAT YOU HAVE BEEN MOVING AROUND A LOT MORE THAN USUAL: NOT AT ALL
4. FEELING TIRED OR HAVING LITTLE ENERGY: NOT AT ALL
1. LITTLE INTEREST OR PLEASURE IN DOING THINGS: NOT AT ALL

## 2020-05-26 NOTE — ASSESSMENT & PLAN NOTE
Here for well woman exam today.  Has had hysterectomy.  Past hx of HPV.  Would like pap.  Up to date on mammogram.  No vaginal concerns, odor, lesions, bleeding.  No breast changes reported.  Recent two year anniversary of sudden death of .    Due for labs and refills today.

## 2020-05-26 NOTE — PROGRESS NOTES
CC:  Pap/Well Woman Exam    History of present illness:  Rima Pope is 59 y.o.white female presenting today for well woman exam with gynecological exam and Pap smear.   She reports her periods are surgical menopause at 38.. Diet working on weight loss. Exercise yard work    Well woman exam with routine gynecological exam  Here for well woman exam today.  Has had hysterectomy.  Past hx of HPV.  Would like pap.  Up to date on mammogram.  Recent two year anniversary of sudden death of .    Due for labs and refills today.       Past Medical History:   Diagnosis Date   • Allergic rhinitis    • Anxiety    • Hyperlipidemia    • Nasal drainage        Past Surgical History:   Procedure Laterality Date   • ABDOMINAL HYSTERECTOMY TOTAL     • HERNIA REPAIR     • HYSTERECTOMY LAPAROSCOPY     • PRIMARY C SECTION         Outpatient Encounter Medications as of 5/26/2020   Medication Sig Dispense Refill   • FLUoxetine (PROZAC) 20 MG Cap Take 1 Cap by mouth every day. 90 Cap 3   • FLUoxetine (PROZAC) 10 MG Cap Take 1 Cap by mouth every day. 90 Cap 3   • fluticasone (FLONASE) 50 MCG/ACT nasal spray SHAKE LIQUID AND USE 1 SPRAY IN EACH NOSTRIL EVERY DAY 3 Bottle 3   • rosuvastatin (CRESTOR) 10 MG Tab TAKE 1 TABLET BY MOUTH EVERY EVENING 90 Tab 3   • [DISCONTINUED] rosuvastatin (CRESTOR) 10 MG Tab TAKE 1 TABLET BY MOUTH EVERY EVENING 90 Tab 0   • [DISCONTINUED] fluticasone (FLONASE) 50 MCG/ACT nasal spray SHAKE LIQUID AND USE 1 SPRAY IN EACH NOSTRIL EVERY DAY 3 Bottle 3   • [DISCONTINUED] FLUoxetine (PROZAC) 20 MG Cap Take 1 Cap by mouth every day. 90 Cap 3   • [DISCONTINUED] FLUoxetine (PROZAC) 10 MG Cap Take 1 Cap by mouth every day. 90 Cap 3   • [DISCONTINUED] ciprofloxacin (CIPRO) 500 MG Tab Take 1 Tab by mouth 2 times a day. 14 Tab 0     No facility-administered encounter medications on file as of 5/26/2020.        Patient Active Problem List    Diagnosis Date Noted   • Abnormal mammogram of left breast 07/23/2018   • Well  woman exam with routine gynecological exam 06/27/2018   • Grieving 06/27/2018   • Chronic maxillary sinusitis 10/19/2017   • Mixed hyperlipidemia 08/23/2017   • Hepatic cyst 08/23/2017   • Breast calcifications on mammogram 08/23/2017   • Ganglion cyst 08/23/2017   • Obesity (BMI 30-39.9) 08/23/2017   • MAXX on CPAP 08/23/2017   • Episode of recurrent major depressive disorder (HCC) 08/23/2017       .  Social History     Socioeconomic History   • Marital status:      Spouse name: Not on file   • Number of children: Not on file   • Years of education: Not on file   • Highest education level: Not on file   Occupational History   • Not on file   Social Needs   • Financial resource strain: Not on file   • Food insecurity     Worry: Not on file     Inability: Not on file   • Transportation needs     Medical: Not on file     Non-medical: Not on file   Tobacco Use   • Smoking status: Never Smoker   • Smokeless tobacco: Never Used   Substance and Sexual Activity   • Alcohol use: Yes     Alcohol/week: 1.2 oz     Types: 2 Glasses of wine per week     Comment: socially    • Drug use: No   • Sexual activity: Yes     Partners: Male   Lifestyle   • Physical activity     Days per week: Not on file     Minutes per session: Not on file   • Stress: Not on file   Relationships   • Social connections     Talks on phone: Not on file     Gets together: Not on file     Attends Cheondoism service: Not on file     Active member of club or organization: Not on file     Attends meetings of clubs or organizations: Not on file     Relationship status: Not on file   • Intimate partner violence     Fear of current or ex partner: Not on file     Emotionally abused: Not on file     Physically abused: Not on file     Forced sexual activity: Not on file   Other Topics Concern   • Not on file   Social History Narrative   • Not on file       Family History   Problem Relation Age of Onset   • Cancer Mother         lung, bladder, melanoma   •  Hyperlipidemia Mother    • Alcohol/Drug Father    • Alcohol/Drug Sister    • Alcohol/Drug Brother    • Alcohol/Drug Brother    • No Known Problems Son    • No Known Problems Daughter          ROS:  Denies Weight loss, fatigue, chest pain, SOB, bowel or bladder changes. No significant dysmenorrhea, concerning vaginal discharge or irritation, no dyspareunia or postcoital bleeding. Denies h/o migraine with aura. Denies musculoskeletal, neurological, or psychiatric problems.      /78 (BP Location: Left arm, Patient Position: Sitting)   Pulse 84   Temp 36.6 °C (97.9 °F) (Temporal)   Resp 16   Wt 77.1 kg (170 lb)   SpO2 100%   BMI 33.20 kg/m²     GEN:  Appears well and in no apparent distress   NECK:  Supple without adenopathy or thyromegaly  LUNGS:  Clear and equal. No wheeze, ronchi, or rales.  CV:  RRR, S1, S2. No murmur.  Pedal pulses 2+ bilaterally.  BREAST:  Symmetrical without masses. No nipple discharge.  ABD:  Soft, non-tender, non-distended, normal bowel sounds.  No hepatosplenomegaly.  :  Normal external female genitalia.  Vaginal canal clear.       Assessment and plan    1. Screening for vaginal cancer  Specimen from vaginal walls to pathology.    - THINPREP PAP WITH HPV; Future    2. Well woman exam with routine gynecological exam  Here for well woman.  Labs ordered, refills provided.  Pap with past hx of HPV, vaginal walls.  Had hysterectomy at age 38.  Monitor and follow.   - CBC WITH DIFFERENTIAL; Future  - Comp Metabolic Panel; Future  - TSH; Future  - VITAMIN D,25 HYDROXY; Future    3. Mixed hyperlipidemia  Chronic, ongoing. Intermittent medication compliance.  Discussed ways to increase compliance.  Labs ordered.  Refill.  Monitor and follow.   - rosuvastatin (CRESTOR) 10 MG Tab; TAKE 1 TABLET BY MOUTH EVERY EVENING  Dispense: 90 Tab; Refill: 3  - Lipid Profile; Future      F/u pending results    A chaperone was offered to the patient during today's exam. Patient declined chaperone.  woman

## 2020-05-29 ENCOUNTER — TELEPHONE (OUTPATIENT)
Dept: MEDICAL GROUP | Facility: PHYSICIAN GROUP | Age: 60
End: 2020-05-29

## 2020-05-29 DIAGNOSIS — R87.629 ABNORMAL PAP SMEAR OF VAGINA: ICD-10-CM

## 2020-05-29 NOTE — TELEPHONE ENCOUNTER
VOICEMAIL  1. Caller Name: Rima Pope.                      Call Back Number: [phone]    2. Message: Pt stated she seen the results and want the GYN Referral does not want to wait 6 months.    3. Patient approves office to leave a detailed voicemail/MyChart message: yes

## 2020-06-01 PROBLEM — R87.629 ABNORMAL PAP SMEAR OF VAGINA: Status: ACTIVE | Noted: 2020-06-01

## 2020-06-02 ENCOUNTER — HOSPITAL ENCOUNTER (OUTPATIENT)
Dept: LAB | Facility: MEDICAL CENTER | Age: 60
End: 2020-06-02
Attending: NURSE PRACTITIONER
Payer: OTHER GOVERNMENT

## 2020-06-02 DIAGNOSIS — E78.2 MIXED HYPERLIPIDEMIA: ICD-10-CM

## 2020-06-02 DIAGNOSIS — Z01.419 WELL WOMAN EXAM WITH ROUTINE GYNECOLOGICAL EXAM: ICD-10-CM

## 2020-06-02 LAB
25(OH)D3 SERPL-MCNC: 22 NG/ML (ref 30–100)
ALBUMIN SERPL BCP-MCNC: 4.4 G/DL (ref 3.2–4.9)
ALBUMIN/GLOB SERPL: 1.4 G/DL
ALP SERPL-CCNC: 82 U/L (ref 30–99)
ALT SERPL-CCNC: 28 U/L (ref 2–50)
ANION GAP SERPL CALC-SCNC: 14 MMOL/L (ref 7–16)
AST SERPL-CCNC: 21 U/L (ref 12–45)
BASOPHILS # BLD AUTO: 0.6 % (ref 0–1.8)
BASOPHILS # BLD: 0.04 K/UL (ref 0–0.12)
BILIRUB SERPL-MCNC: 0.4 MG/DL (ref 0.1–1.5)
BUN SERPL-MCNC: 13 MG/DL (ref 8–22)
CALCIUM SERPL-MCNC: 9.2 MG/DL (ref 8.5–10.5)
CHLORIDE SERPL-SCNC: 103 MMOL/L (ref 96–112)
CHOLEST SERPL-MCNC: 223 MG/DL (ref 100–199)
CO2 SERPL-SCNC: 23 MMOL/L (ref 20–33)
CREAT SERPL-MCNC: 0.56 MG/DL (ref 0.5–1.4)
EOSINOPHIL # BLD AUTO: 0.24 K/UL (ref 0–0.51)
EOSINOPHIL NFR BLD: 3.4 % (ref 0–6.9)
ERYTHROCYTE [DISTWIDTH] IN BLOOD BY AUTOMATED COUNT: 44.1 FL (ref 35.9–50)
GLOBULIN SER CALC-MCNC: 3.2 G/DL (ref 1.9–3.5)
GLUCOSE SERPL-MCNC: 88 MG/DL (ref 65–99)
HCT VFR BLD AUTO: 41.3 % (ref 37–47)
HDLC SERPL-MCNC: 67 MG/DL
HGB BLD-MCNC: 13.4 G/DL (ref 12–16)
IMM GRANULOCYTES # BLD AUTO: 0.02 K/UL (ref 0–0.11)
IMM GRANULOCYTES NFR BLD AUTO: 0.3 % (ref 0–0.9)
LDLC SERPL CALC-MCNC: 132 MG/DL
LYMPHOCYTES # BLD AUTO: 2.42 K/UL (ref 1–4.8)
LYMPHOCYTES NFR BLD: 33.8 % (ref 22–41)
MCH RBC QN AUTO: 30.5 PG (ref 27–33)
MCHC RBC AUTO-ENTMCNC: 32.4 G/DL (ref 33.6–35)
MCV RBC AUTO: 93.9 FL (ref 81.4–97.8)
MONOCYTES # BLD AUTO: 0.68 K/UL (ref 0–0.85)
MONOCYTES NFR BLD AUTO: 9.5 % (ref 0–13.4)
NEUTROPHILS # BLD AUTO: 3.76 K/UL (ref 2–7.15)
NEUTROPHILS NFR BLD: 52.4 % (ref 44–72)
NRBC # BLD AUTO: 0 K/UL
NRBC BLD-RTO: 0 /100 WBC
PLATELET # BLD AUTO: 193 K/UL (ref 164–446)
PMV BLD AUTO: 11.6 FL (ref 9–12.9)
POTASSIUM SERPL-SCNC: 4.1 MMOL/L (ref 3.6–5.5)
PROT SERPL-MCNC: 7.6 G/DL (ref 6–8.2)
RBC # BLD AUTO: 4.4 M/UL (ref 4.2–5.4)
SODIUM SERPL-SCNC: 140 MMOL/L (ref 135–145)
TRIGL SERPL-MCNC: 119 MG/DL (ref 0–149)
TSH SERPL DL<=0.005 MIU/L-ACNC: 1.42 UIU/ML (ref 0.38–5.33)
WBC # BLD AUTO: 7.2 K/UL (ref 4.8–10.8)

## 2020-06-02 PROCEDURE — 84443 ASSAY THYROID STIM HORMONE: CPT

## 2020-06-02 PROCEDURE — 82306 VITAMIN D 25 HYDROXY: CPT

## 2020-06-02 PROCEDURE — 85025 COMPLETE CBC W/AUTO DIFF WBC: CPT

## 2020-06-02 PROCEDURE — 80061 LIPID PANEL: CPT

## 2020-06-02 PROCEDURE — 36415 COLL VENOUS BLD VENIPUNCTURE: CPT

## 2020-06-02 PROCEDURE — 80053 COMPREHEN METABOLIC PANEL: CPT

## 2020-06-02 RX ORDER — HYDROXYZINE HYDROCHLORIDE 25 MG/1
25 TABLET, FILM COATED ORAL 2 TIMES DAILY PRN
Qty: 30 TAB | Refills: 1 | Status: SHIPPED | OUTPATIENT
Start: 2020-06-02 | End: 2021-08-31 | Stop reason: SDUPTHER

## 2020-06-09 ENCOUNTER — APPOINTMENT (OUTPATIENT)
Dept: RADIOLOGY | Facility: MEDICAL CENTER | Age: 60
End: 2020-06-09
Attending: NURSE PRACTITIONER
Payer: OTHER GOVERNMENT

## 2020-06-11 ENCOUNTER — APPOINTMENT (OUTPATIENT)
Dept: RADIOLOGY | Facility: MEDICAL CENTER | Age: 60
End: 2020-06-11
Attending: NURSE PRACTITIONER
Payer: OTHER GOVERNMENT

## 2020-06-21 ENCOUNTER — HOSPITAL ENCOUNTER (OUTPATIENT)
Dept: RADIOLOGY | Facility: MEDICAL CENTER | Age: 60
End: 2020-06-21
Attending: NURSE PRACTITIONER
Payer: OTHER GOVERNMENT

## 2020-06-21 DIAGNOSIS — K76.89 HEPATIC CYST: ICD-10-CM

## 2020-06-21 PROCEDURE — 76700 US EXAM ABDOM COMPLETE: CPT

## 2020-06-22 ENCOUNTER — PATIENT MESSAGE (OUTPATIENT)
Dept: MEDICAL GROUP | Facility: PHYSICIAN GROUP | Age: 60
End: 2020-06-22

## 2020-08-17 DIAGNOSIS — E78.2 MIXED HYPERLIPIDEMIA: ICD-10-CM

## 2020-08-17 RX ORDER — ROSUVASTATIN CALCIUM 10 MG/1
TABLET, COATED ORAL
Qty: 90 TAB | Refills: 3 | Status: SHIPPED | OUTPATIENT
Start: 2020-08-17 | End: 2021-08-31 | Stop reason: SDUPTHER

## 2020-08-17 NOTE — TELEPHONE ENCOUNTER
Received request via: Pharmacy    Was the patient seen in the last year in this department? Yes LOV 05/26/2020 LABS 6/02/2020    Does the patient have an active prescription (recently filled or refills available) for medication(s) requested? No

## 2020-08-17 NOTE — TELEPHONE ENCOUNTER
Requested Prescriptions     Signed Prescriptions Disp Refills   • rosuvastatin (CRESTOR) 10 MG Tab 90 Tab 3     Sig: TAKE 1 TABLET BY MOUTH EVERY EVENING     Authorizing Provider: EDITH JAY A.P.R.N.

## 2020-08-18 ENCOUNTER — HOSPITAL ENCOUNTER (OUTPATIENT)
Dept: RADIOLOGY | Facility: MEDICAL CENTER | Age: 60
End: 2020-08-18
Attending: NURSE PRACTITIONER
Payer: OTHER GOVERNMENT

## 2020-08-18 DIAGNOSIS — Z12.31 VISIT FOR SCREENING MAMMOGRAM: ICD-10-CM

## 2020-08-18 PROCEDURE — 77067 SCR MAMMO BI INCL CAD: CPT

## 2021-06-02 DIAGNOSIS — J32.0 CHRONIC MAXILLARY SINUSITIS: ICD-10-CM

## 2021-06-02 RX ORDER — FLUTICASONE PROPIONATE 50 MCG
SPRAY, SUSPENSION (ML) NASAL
Qty: 16 G | Refills: 6 | Status: SHIPPED | OUTPATIENT
Start: 2021-06-02 | End: 2021-08-31 | Stop reason: SDUPTHER

## 2021-06-02 NOTE — TELEPHONE ENCOUNTER
Requested Prescriptions     Signed Prescriptions Disp Refills   • fluticasone (FLONASE) 50 MCG/ACT nasal spray 16 g 6     Sig: SHAKE LIQUID AND USE 1 SPRAY IN EACH NOSTRIL EVERY DAY     Authorizing Provider: EDITH JAY A.P.R.N.

## 2021-06-02 NOTE — TELEPHONE ENCOUNTER
Received request via: Pharmacy    Was the patient seen in the last year in this department? Yes LOV 05/20/2020    Does the patient have an active prescription (recently filled or refills available) for medication(s) requested? No

## 2021-06-21 ENCOUNTER — PATIENT MESSAGE (OUTPATIENT)
Dept: MEDICAL GROUP | Facility: PHYSICIAN GROUP | Age: 61
End: 2021-06-21

## 2021-06-21 RX ORDER — FLUOXETINE 10 MG/1
10 CAPSULE ORAL DAILY
Qty: 90 CAPSULE | Refills: 0 | Status: SHIPPED | OUTPATIENT
Start: 2021-06-21 | End: 2021-08-31 | Stop reason: SDUPTHER

## 2021-06-21 RX ORDER — FLUOXETINE HYDROCHLORIDE 20 MG/1
20 CAPSULE ORAL
Qty: 90 CAPSULE | Refills: 0 | Status: SHIPPED | OUTPATIENT
Start: 2021-06-21 | End: 2021-08-31 | Stop reason: SDUPTHER

## 2021-06-21 NOTE — PATIENT COMMUNICATION
Received request via: Patient    Was the patient seen in the last year in this department? No  LAST SEEN 5/26/20, WILL BE SCHEDULING ANNUAL SOON PER PATIENT     Does the patient have an active prescription (recently filled or refills available) for medication(s) requested? No

## 2021-08-31 ENCOUNTER — OFFICE VISIT (OUTPATIENT)
Dept: MEDICAL GROUP | Facility: PHYSICIAN GROUP | Age: 61
End: 2021-08-31
Payer: OTHER GOVERNMENT

## 2021-08-31 ENCOUNTER — HOSPITAL ENCOUNTER (OUTPATIENT)
Facility: MEDICAL CENTER | Age: 61
End: 2021-08-31
Attending: NURSE PRACTITIONER
Payer: OTHER GOVERNMENT

## 2021-08-31 VITALS
HEIGHT: 60 IN | OXYGEN SATURATION: 96 % | HEART RATE: 90 BPM | BODY MASS INDEX: 31.41 KG/M2 | RESPIRATION RATE: 16 BRPM | DIASTOLIC BLOOD PRESSURE: 64 MMHG | WEIGHT: 160 LBS | SYSTOLIC BLOOD PRESSURE: 100 MMHG | TEMPERATURE: 98.2 F

## 2021-08-31 DIAGNOSIS — Z12.31 ENCOUNTER FOR SCREENING MAMMOGRAM FOR MALIGNANT NEOPLASM OF BREAST: ICD-10-CM

## 2021-08-31 DIAGNOSIS — Z12.4 SCREENING FOR CERVICAL CANCER: ICD-10-CM

## 2021-08-31 DIAGNOSIS — E78.2 MIXED HYPERLIPIDEMIA: ICD-10-CM

## 2021-08-31 DIAGNOSIS — R87.629 ABNORMAL PAP SMEAR OF VAGINA: ICD-10-CM

## 2021-08-31 DIAGNOSIS — E55.9 VITAMIN D DEFICIENCY: ICD-10-CM

## 2021-08-31 DIAGNOSIS — Z01.419 WELL WOMAN EXAM WITH ROUTINE GYNECOLOGICAL EXAM: ICD-10-CM

## 2021-08-31 DIAGNOSIS — Z12.11 ENCOUNTER FOR SCREENING FECAL OCCULT BLOOD TESTING: ICD-10-CM

## 2021-08-31 DIAGNOSIS — Z23 NEED FOR VACCINATION: ICD-10-CM

## 2021-08-31 DIAGNOSIS — F33.2 SEVERE EPISODE OF RECURRENT MAJOR DEPRESSIVE DISORDER, WITHOUT PSYCHOTIC FEATURES (HCC): ICD-10-CM

## 2021-08-31 DIAGNOSIS — J32.0 CHRONIC MAXILLARY SINUSITIS: ICD-10-CM

## 2021-08-31 PROCEDURE — 99396 PREV VISIT EST AGE 40-64: CPT | Mod: 25 | Performed by: NURSE PRACTITIONER

## 2021-08-31 PROCEDURE — 87624 HPV HI-RISK TYP POOLED RSLT: CPT

## 2021-08-31 PROCEDURE — 90471 IMMUNIZATION ADMIN: CPT | Performed by: NURSE PRACTITIONER

## 2021-08-31 PROCEDURE — 90750 HZV VACC RECOMBINANT IM: CPT | Performed by: NURSE PRACTITIONER

## 2021-08-31 PROCEDURE — 88175 CYTOPATH C/V AUTO FLUID REDO: CPT

## 2021-08-31 PROCEDURE — 99000 SPECIMEN HANDLING OFFICE-LAB: CPT | Performed by: NURSE PRACTITIONER

## 2021-08-31 RX ORDER — FLUOXETINE HYDROCHLORIDE 20 MG/1
20 CAPSULE ORAL
Qty: 90 CAPSULE | Refills: 3 | Status: SHIPPED | OUTPATIENT
Start: 2021-08-31 | End: 2021-11-23 | Stop reason: SDUPTHER

## 2021-08-31 RX ORDER — HYDROXYZINE HYDROCHLORIDE 25 MG/1
25 TABLET, FILM COATED ORAL 2 TIMES DAILY PRN
Qty: 60 TABLET | Refills: 1 | Status: SHIPPED | OUTPATIENT
Start: 2021-08-31 | End: 2022-05-16 | Stop reason: SDUPTHER

## 2021-08-31 RX ORDER — FLUTICASONE PROPIONATE 50 MCG
SPRAY, SUSPENSION (ML) NASAL
Qty: 16 G | Refills: 6 | Status: SHIPPED | OUTPATIENT
Start: 2021-08-31 | End: 2022-05-16 | Stop reason: SDUPTHER

## 2021-08-31 RX ORDER — ROSUVASTATIN CALCIUM 10 MG/1
TABLET, COATED ORAL
Qty: 90 TABLET | Refills: 3 | Status: SHIPPED | OUTPATIENT
Start: 2021-08-31 | End: 2021-11-22 | Stop reason: SDUPTHER

## 2021-08-31 RX ORDER — FLUOXETINE 10 MG/1
10 CAPSULE ORAL DAILY
Qty: 90 CAPSULE | Refills: 3 | Status: SHIPPED | OUTPATIENT
Start: 2021-08-31 | End: 2021-11-23 | Stop reason: SDUPTHER

## 2021-08-31 ASSESSMENT — FIBROSIS 4 INDEX: FIB4 SCORE: 1.27

## 2021-08-31 NOTE — PROGRESS NOTES
CC:  Pap/Well Woman Exam    History of present illness:  Rima Pope is 60 y.o. female presenting today for well woman exam with gynecological exam and Pap smear.   She reports her periods are menopausal/post hysterectomy.    Abnormal Pap smear of vagina  Had follow up with GYN and colposcopy.  Here for follow up pap today.      Past Medical History:   Diagnosis Date   • Allergic rhinitis    • Anxiety    • Hyperlipidemia    • Nasal drainage        Past Surgical History:   Procedure Laterality Date   • ABDOMINAL HYSTERECTOMY TOTAL     • HERNIA REPAIR     • HYSTERECTOMY LAPAROSCOPY     • PRIMARY C SECTION         Outpatient Encounter Medications as of 8/31/2021   Medication Sig Dispense Refill   • FLUoxetine (PROZAC) 10 MG Cap Take 1 capsule by mouth every day. 90 capsule 0   • FLUoxetine (PROZAC) 20 MG Cap Take 1 capsule by mouth every day. 90 capsule 0   • fluticasone (FLONASE) 50 MCG/ACT nasal spray SHAKE LIQUID AND USE 1 SPRAY IN EACH NOSTRIL EVERY DAY 16 g 6   • rosuvastatin (CRESTOR) 10 MG Tab TAKE 1 TABLET BY MOUTH EVERY EVENING 90 Tab 3   • hydrOXYzine HCl (ATARAX) 25 MG Tab Take 1 Tab by mouth 2 times a day as needed for Anxiety. (Patient not taking: Reported on 8/31/2021) 30 Tab 1     No facility-administered encounter medications on file as of 8/31/2021.       Patient Active Problem List    Diagnosis Date Noted   • Abnormal Pap smear of vagina 06/01/2020   • Abnormal mammogram of left breast 07/23/2018   • Well woman exam with routine gynecological exam 06/27/2018   • Grieving 06/27/2018   • Chronic maxillary sinusitis 10/19/2017   • Mixed hyperlipidemia 08/23/2017   • Hepatic cyst 08/23/2017   • Breast calcifications on mammogram 08/23/2017   • Ganglion cyst 08/23/2017   • Obesity (BMI 30-39.9) 08/23/2017   • MAXX on CPAP 08/23/2017   • Episode of recurrent major depressive disorder (HCC) 08/23/2017       .  Social History     Socioeconomic History   • Marital status:      Spouse name: Not on file    • Number of children: Not on file   • Years of education: Not on file   • Highest education level: Not on file   Occupational History   • Not on file   Tobacco Use   • Smoking status: Never Smoker   • Smokeless tobacco: Never Used   Substance and Sexual Activity   • Alcohol use: Yes     Alcohol/week: 1.2 oz     Types: 2 Glasses of wine per week     Comment: socially    • Drug use: No   • Sexual activity: Yes     Partners: Male   Other Topics Concern   • Not on file   Social History Narrative   • Not on file     Social Determinants of Health     Financial Resource Strain:    • Difficulty of Paying Living Expenses:    Food Insecurity:    • Worried About Running Out of Food in the Last Year:    • Ran Out of Food in the Last Year:    Transportation Needs:    • Lack of Transportation (Medical):    • Lack of Transportation (Non-Medical):    Physical Activity:    • Days of Exercise per Week:    • Minutes of Exercise per Session:    Stress:    • Feeling of Stress :    Social Connections:    • Frequency of Communication with Friends and Family:    • Frequency of Social Gatherings with Friends and Family:    • Attends [employer] Services:    • Active Member of Clubs or Organizations:    • Attends Club or Organization Meetings:    • Marital Status:    Intimate Partner Violence:    • Fear of Current or Ex-Partner:    • Emotionally Abused:    • Physically Abused:    • Sexually Abused:        Family History   Problem Relation Age of Onset   • Cancer Mother         lung, bladder, melanoma   • Hyperlipidemia Mother    • Alcohol/Drug Father    • Alcohol/Drug Sister    • Alcohol/Drug Brother    • Alcohol/Drug Brother    • No Known Problems Son    • No Known Problems Daughter          ROS:  Denies Weight loss, fatigue, chest pain, SOB, bowel or bladder changes. No significant dysmenorrhea, concerning vaginal discharge or irritation, no dyspareunia or postcoital bleeding. Denies h/o migraine with aura. Denies musculoskeletal,  neurological, or psychiatric problems.      /64 (BP Location: Left arm, Patient Position: Sitting)   Pulse 90   Temp 36.8 °C (98.2 °F) (Temporal)   Resp 16   Ht 1.524 m (5')   Wt 72.6 kg (160 lb)   LMP 09/24/1999   SpO2 96%   BMI 31.25 kg/m²     GEN:  Appears well and in no apparent distress   NECK:  Supple without adenopathy or thyromegaly  LUNGS:  Clear and equal. No wheeze, ronchi, or rales.  CV:  RRR, S1, S2. No murmur.  Pedal pulses 2+ bilaterally.  BREAST:  Symmetrical without masses. No nipple discharge.  ABD:  Soft, non-tender, non-distended, normal bowel sounds.  No hepatosplenomegaly.  :  Normal external female genitalia.  Vaginal canal clear.  Cervix appears normal. Specimen collected from transformation zone. Bimanual exam:  No CMT, normal size uterus without masses or tenderness; no adnexal masses or tenderness.      Assessment and plan    1. Abnormal Pap smear of vagina  Previous hx.  Has had colposcopy.  Will recheck today. Pap to pathology.  Monitor and follow. See #2    2. Screening for cervical cancer  See #1  - THINPREP PAP WITH HPV; Future    Discussion today about general wellness and lifestyle habits:   *engage in regular physical and social activities   *prevent falls and reduce trip hazards, using ambulatory aids, hearing and vision testing if appropriate   *skin care, including sunscreen        F/u pending results    A chaperone was offered to the patient during today's exam. Patient declined chaperone.

## 2021-08-31 NOTE — ASSESSMENT & PLAN NOTE
Here for well woman.  PAP post hysterectomy but has had abnormal cells last year.  Colposcopy done with GYN venessa james with negative results.  Repeat pap today.  Due for labs, mammo and refills.  Also discussed vaccines.  Shingles today.   No breast changes reported.  No vaginal lesions, odor or bleeding reported. Using lubricant for intercourse.

## 2021-08-31 NOTE — ASSESSMENT & PLAN NOTE
Doing better.  Has  now.  He is a .  Fluoxetine 30 mg is controlling symptoms.  Mother is terminal with CA.  Refills today.

## 2021-09-01 DIAGNOSIS — R87.629 ABNORMAL PAP SMEAR OF VAGINA: ICD-10-CM

## 2021-09-20 ENCOUNTER — HOSPITAL ENCOUNTER (OUTPATIENT)
Dept: RADIOLOGY | Facility: MEDICAL CENTER | Age: 61
End: 2021-09-20
Attending: NURSE PRACTITIONER
Payer: OTHER GOVERNMENT

## 2021-09-20 DIAGNOSIS — Z12.31 ENCOUNTER FOR SCREENING MAMMOGRAM FOR MALIGNANT NEOPLASM OF BREAST: ICD-10-CM

## 2021-09-20 PROCEDURE — 77063 BREAST TOMOSYNTHESIS BI: CPT

## 2021-09-21 ENCOUNTER — HOSPITAL ENCOUNTER (OUTPATIENT)
Dept: LAB | Facility: MEDICAL CENTER | Age: 61
End: 2021-09-21
Attending: NURSE PRACTITIONER
Payer: OTHER GOVERNMENT

## 2021-09-21 DIAGNOSIS — E78.2 MIXED HYPERLIPIDEMIA: ICD-10-CM

## 2021-09-21 DIAGNOSIS — E55.9 VITAMIN D DEFICIENCY: ICD-10-CM

## 2021-09-21 LAB
25(OH)D3 SERPL-MCNC: 23 NG/ML (ref 30–100)
ALBUMIN SERPL BCP-MCNC: 4.2 G/DL (ref 3.2–4.9)
ALBUMIN/GLOB SERPL: 1.3 G/DL
ALP SERPL-CCNC: 79 U/L (ref 30–99)
ALT SERPL-CCNC: 21 U/L (ref 2–50)
ANION GAP SERPL CALC-SCNC: 10 MMOL/L (ref 7–16)
AST SERPL-CCNC: 21 U/L (ref 12–45)
BASOPHILS # BLD AUTO: 0.6 % (ref 0–1.8)
BASOPHILS # BLD: 0.04 K/UL (ref 0–0.12)
BILIRUB SERPL-MCNC: 0.5 MG/DL (ref 0.1–1.5)
BUN SERPL-MCNC: 10 MG/DL (ref 8–22)
CALCIUM SERPL-MCNC: 9.5 MG/DL (ref 8.5–10.5)
CHLORIDE SERPL-SCNC: 104 MMOL/L (ref 96–112)
CHOLEST SERPL-MCNC: 200 MG/DL (ref 100–199)
CO2 SERPL-SCNC: 25 MMOL/L (ref 20–33)
CREAT SERPL-MCNC: 0.49 MG/DL (ref 0.5–1.4)
EOSINOPHIL # BLD AUTO: 0.23 K/UL (ref 0–0.51)
EOSINOPHIL NFR BLD: 3.6 % (ref 0–6.9)
ERYTHROCYTE [DISTWIDTH] IN BLOOD BY AUTOMATED COUNT: 44.6 FL (ref 35.9–50)
FASTING STATUS PATIENT QL REPORTED: NORMAL
GLOBULIN SER CALC-MCNC: 3.3 G/DL (ref 1.9–3.5)
GLUCOSE SERPL-MCNC: 91 MG/DL (ref 65–99)
HCT VFR BLD AUTO: 39.4 % (ref 37–47)
HDLC SERPL-MCNC: 80 MG/DL
HGB BLD-MCNC: 12.6 G/DL (ref 12–16)
IMM GRANULOCYTES # BLD AUTO: 0 K/UL (ref 0–0.11)
IMM GRANULOCYTES NFR BLD AUTO: 0 % (ref 0–0.9)
LDLC SERPL CALC-MCNC: 94 MG/DL
LYMPHOCYTES # BLD AUTO: 2.71 K/UL (ref 1–4.8)
LYMPHOCYTES NFR BLD: 42.5 % (ref 22–41)
MCH RBC QN AUTO: 30.7 PG (ref 27–33)
MCHC RBC AUTO-ENTMCNC: 32 G/DL (ref 33.6–35)
MCV RBC AUTO: 96.1 FL (ref 81.4–97.8)
MONOCYTES # BLD AUTO: 0.54 K/UL (ref 0–0.85)
MONOCYTES NFR BLD AUTO: 8.5 % (ref 0–13.4)
NEUTROPHILS # BLD AUTO: 2.86 K/UL (ref 2–7.15)
NEUTROPHILS NFR BLD: 44.8 % (ref 44–72)
NRBC # BLD AUTO: 0 K/UL
NRBC BLD-RTO: 0 /100 WBC
PLATELET # BLD AUTO: 205 K/UL (ref 164–446)
PMV BLD AUTO: 13.1 FL (ref 9–12.9)
POTASSIUM SERPL-SCNC: 4.1 MMOL/L (ref 3.6–5.5)
PROT SERPL-MCNC: 7.5 G/DL (ref 6–8.2)
RBC # BLD AUTO: 4.1 M/UL (ref 4.2–5.4)
SODIUM SERPL-SCNC: 139 MMOL/L (ref 135–145)
T4 FREE SERPL-MCNC: 1.12 NG/DL (ref 0.93–1.7)
TRIGL SERPL-MCNC: 128 MG/DL (ref 0–149)
TSH SERPL DL<=0.005 MIU/L-ACNC: 0.78 UIU/ML (ref 0.38–5.33)
WBC # BLD AUTO: 6.4 K/UL (ref 4.8–10.8)

## 2021-09-21 PROCEDURE — 84443 ASSAY THYROID STIM HORMONE: CPT

## 2021-09-21 PROCEDURE — 36415 COLL VENOUS BLD VENIPUNCTURE: CPT

## 2021-09-21 PROCEDURE — 82306 VITAMIN D 25 HYDROXY: CPT

## 2021-09-21 PROCEDURE — 85025 COMPLETE CBC W/AUTO DIFF WBC: CPT

## 2021-09-21 PROCEDURE — 84439 ASSAY OF FREE THYROXINE: CPT

## 2021-09-21 PROCEDURE — 80053 COMPREHEN METABOLIC PANEL: CPT

## 2021-09-21 PROCEDURE — 80061 LIPID PANEL: CPT

## 2021-11-22 ENCOUNTER — OFFICE VISIT (OUTPATIENT)
Dept: MEDICAL GROUP | Facility: PHYSICIAN GROUP | Age: 61
End: 2021-11-22
Payer: OTHER GOVERNMENT

## 2021-11-22 VITALS
BODY MASS INDEX: 33.06 KG/M2 | DIASTOLIC BLOOD PRESSURE: 86 MMHG | HEART RATE: 70 BPM | SYSTOLIC BLOOD PRESSURE: 128 MMHG | RESPIRATION RATE: 16 BRPM | OXYGEN SATURATION: 97 % | WEIGHT: 168.4 LBS | HEIGHT: 60 IN | TEMPERATURE: 98.4 F

## 2021-11-22 DIAGNOSIS — F33.2 SEVERE EPISODE OF RECURRENT MAJOR DEPRESSIVE DISORDER, WITHOUT PSYCHOTIC FEATURES (HCC): ICD-10-CM

## 2021-11-22 DIAGNOSIS — R92.1 BREAST CALCIFICATIONS ON MAMMOGRAM: ICD-10-CM

## 2021-11-22 DIAGNOSIS — K76.89 HEPATIC CYST: ICD-10-CM

## 2021-11-22 DIAGNOSIS — Z23 NEED FOR VACCINATION: ICD-10-CM

## 2021-11-22 DIAGNOSIS — R87.629 ABNORMAL PAP SMEAR OF VAGINA: ICD-10-CM

## 2021-11-22 DIAGNOSIS — E66.9 OBESITY (BMI 30-39.9): ICD-10-CM

## 2021-11-22 DIAGNOSIS — L98.9 SKIN ABNORMALITY: ICD-10-CM

## 2021-11-22 DIAGNOSIS — G47.33 OSA ON CPAP: ICD-10-CM

## 2021-11-22 DIAGNOSIS — F43.21 GRIEVING: ICD-10-CM

## 2021-11-22 DIAGNOSIS — E78.2 MIXED HYPERLIPIDEMIA: ICD-10-CM

## 2021-11-22 PROBLEM — R92.8 ABNORMAL MAMMOGRAM OF LEFT BREAST: Status: RESOLVED | Noted: 2018-07-23 | Resolved: 2021-11-22

## 2021-11-22 PROBLEM — Z01.419 WELL WOMAN EXAM WITH ROUTINE GYNECOLOGICAL EXAM: Status: RESOLVED | Noted: 2018-06-27 | Resolved: 2021-11-22

## 2021-11-22 PROBLEM — J32.0 CHRONIC MAXILLARY SINUSITIS: Status: RESOLVED | Noted: 2017-10-19 | Resolved: 2021-11-22

## 2021-11-22 PROBLEM — M67.40 GANGLION CYST: Status: RESOLVED | Noted: 2017-08-23 | Resolved: 2021-11-22

## 2021-11-22 PROCEDURE — 90686 IIV4 VACC NO PRSV 0.5 ML IM: CPT | Performed by: NURSE PRACTITIONER

## 2021-11-22 PROCEDURE — 99213 OFFICE O/P EST LOW 20 MIN: CPT | Mod: 25 | Performed by: NURSE PRACTITIONER

## 2021-11-22 PROCEDURE — 90471 IMMUNIZATION ADMIN: CPT | Performed by: NURSE PRACTITIONER

## 2021-11-22 RX ORDER — ROSUVASTATIN CALCIUM 10 MG/1
TABLET, COATED ORAL
Qty: 90 TABLET | Refills: 3 | Status: SHIPPED | OUTPATIENT
Start: 2021-11-22 | End: 2022-05-16 | Stop reason: SDUPTHER

## 2021-11-22 RX ORDER — AMOXICILLIN AND CLAVULANATE POTASSIUM 875; 125 MG/1; MG/1
TABLET, FILM COATED ORAL
COMMUNITY
Start: 2021-09-01 | End: 2021-11-22

## 2021-11-22 RX ORDER — MOMETASONE FUROATE 50 UG/1
SPRAY, METERED NASAL DAILY
COMMUNITY
End: 2021-11-22

## 2021-11-22 RX ORDER — FLUTICASONE PROPIONATE 50 MCG
1 SPRAY, SUSPENSION (ML) NASAL DAILY
COMMUNITY
End: 2021-11-22

## 2021-11-22 RX ORDER — EZETIMIBE 10 MG/1
10 TABLET ORAL DAILY
COMMUNITY
End: 2021-11-22

## 2021-11-22 ASSESSMENT — ENCOUNTER SYMPTOMS
FEVER: 0
DIZZINESS: 0
CHILLS: 0
DIARRHEA: 0
NAUSEA: 0
WEIGHT LOSS: 0
CONSTIPATION: 0
VOMITING: 0
MUSCULOSKELETAL NEGATIVE: 1
EYES NEGATIVE: 1
SHORTNESS OF BREATH: 0
PALPITATIONS: 0
BLOOD IN STOOL: 0
NERVOUS/ANXIOUS: 0
HEARTBURN: 0
DEPRESSION: 1
HEADACHES: 0
COUGH: 0
ABDOMINAL PAIN: 0
WEAKNESS: 0

## 2021-11-22 ASSESSMENT — FIBROSIS 4 INDEX: FIB4 SCORE: 1.41

## 2021-11-22 ASSESSMENT — PATIENT HEALTH QUESTIONNAIRE - PHQ9
SUM OF ALL RESPONSES TO PHQ QUESTIONS 1-9: 1
5. POOR APPETITE OR OVEREATING: 0 - NOT AT ALL
CLINICAL INTERPRETATION OF PHQ2 SCORE: 1

## 2021-11-22 NOTE — ASSESSMENT & PLAN NOTE
Chronic and stable condition      Takes 30mg Prozac daily   Mother has Terminal cancer and father is also getting older  Patient cares for them long distance and has a caregiver at their house  Increased stress but tolerable

## 2021-11-22 NOTE — ASSESSMENT & PLAN NOTE
Chronic and stable condition   Noted 6 years ago  Gets annual screenings with US- abd   Last imaging study showed stable at 1.8cm  Would like new imaging studies

## 2021-11-22 NOTE — PROGRESS NOTES
Chief Complaint   Patient presents with   • Establish Care   • Referral Needed     dermatologist, US   • Medication Refill     rosucastatin       Subjective:     Rima Pope is a 61 y.o. female presenting to establish care and management of      Mixed hyperlipidemia  Chronic and stable condition   Controlled with rosuvastatin nightly     Results for RIMA POPE (MRN [id number]) as of 11/22/2021 13:45   Ref. Range 9/21/2021 11:04   Cholesterol,Tot Latest Ref Range: 100 - 199 mg/dL 200 (H)   Triglycerides Latest Ref Range: 0 - 149 mg/dL 128   HDL Latest Ref Range: >=40 mg/dL 80   LDL Latest Ref Range: <100 mg/dL 94       Hepatic cyst  Chronic and stable condition   Noted 6 years ago  Gets annual screenings with US- abd   Last imaging study showed stable at 1.8cm  Would like new imaging studies      Breast calcifications on mammogram  Chronic and stable condition   Stable mammograms   9/2021 mammogram below  IMPRESSION:        1.  Breasts have scattered areas of fibroglandular density, with no radiographic evidence of malignancy and no interval change.     2.  Screening mammogram in one year is recommended.    Obesity (BMI 30-39.9)  Chronic and stable condition   Is active with walking  32.89 BMI, wt 168      MAXX on CPAP  Chronic and stable condition   Has a CPAP but does not use it as much  Stopped using it after her  passed  Denies fatigue, waking up in middle of night, orthopnea    Episode of recurrent major depressive disorder (HCC)  Chronic and stable condition      Takes 30mg Prozac daily   Mother has Terminal cancer and father is also getting older  Patient cares for them long distance and has a caregiver at their house  Increased stress but tolerable      Abnormal Pap smear of vagina  Chronic and stable condition   Has hystreectomy at 39yo  Due to HOV  Does get well womens exams, most recent 9/2021 which came back normal     Grieving  Improving.   Followed with therapists  Continues to girve   but is woring thro it  Takes 30mg prozac         Review of Systems   Constitutional: Negative for chills, fever, malaise/fatigue and weight loss.   HENT: Negative.    Eyes: Negative.    Respiratory: Negative for cough and shortness of breath.    Cardiovascular: Negative for chest pain and palpitations.   Gastrointestinal: Negative for abdominal pain, blood in stool, constipation, diarrhea, heartburn, nausea and vomiting.   Genitourinary: Negative for dysuria and hematuria.   Musculoskeletal: Negative.    Skin: Negative.    Neurological: Negative for dizziness, weakness and headaches.   Endo/Heme/Allergies: Negative.    Psychiatric/Behavioral: Positive for depression. Negative for suicidal ideas. The patient is not nervous/anxious.         Continues to grieve for her             Current Outpatient Medications:   •  rosuvastatin (CRESTOR) 10 MG Tab, TAKE 1 TABLET BY MOUTH EVERY EVENING, Disp: 90 Tablet, Rfl: 3  •  FLUoxetine (PROZAC) 10 MG Cap, Take 1 Capsule by mouth every day., Disp: 90 Capsule, Rfl: 3  •  FLUoxetine (PROZAC) 20 MG Cap, Take 1 Capsule by mouth every day., Disp: 90 Capsule, Rfl: 3  •  fluticasone (FLONASE) 50 MCG/ACT nasal spray, SHAKE LIQUID AND USE 1 SPRAY IN EACH NOSTRIL EVERY DAY, Disp: 16 g, Rfl: 6  •  hydrOXYzine HCl (ATARAX) 25 MG Tab, Take 1 Tablet by mouth 2 times a day as needed for Anxiety., Disp: 60 Tablet, Rfl: 1    Past Medical History:   Diagnosis Date   • Abnormal mammogram of left breast 7/23/2018 7/23/18 left breast with irregularities.  Diagnostic and ultrasound ordered.    • Allergic rhinitis    • Anxiety    • Hyperlipidemia    • Nasal drainage        Past Surgical History:   Procedure Laterality Date   • ABDOMINAL HYSTERECTOMY TOTAL     • HERNIA REPAIR     • HYSTERECTOMY LAPAROSCOPY     • PRIMARY C SECTION         Family History   Problem Relation Age of Onset   • Cancer Mother         lung, bladder, melanoma   • Hyperlipidemia Mother    • Alcohol/Drug Father     • Alcohol/Drug Sister    • Alcohol/Drug Brother    • Alcohol/Drug Brother    • No Known Problems Son    • No Known Problems Daughter        Sulfa drugs    Allergies, past medical history, past surgical history, family history, social history reviewed and updated    Objective:     Vitals: /86   Pulse 70   Temp 36.9 °C (98.4 °F) (Temporal)   Resp 16   Ht 1.524 m (5')   Wt 76.4 kg (168 lb 6.4 oz)   LMP 09/24/1999   SpO2 97%   BMI 32.89 kg/m²   General: Alert, pleasant, NAD  EYES:   PERRL, EOMI, no icterus or pallor.  Conjunctivae and lids normal.   HENT:  Normocephalic.  External ears normal. Tympanic membranes pearly, opaque.  No nasal drainage present.  Oropharynx non-erythematous, mucous membranes moist.  Neck supple.   No thyromegaly or masses palpated. No cervical or supraclavicular lymphadenopathy.  Heart: Regular rate and rhythm.  S1 and S2 normal.  No murmurs appreciated.  Respiratory: Normal respiratory effort.  Clear to auscultation bilaterally.  Abdomen: Non-distended, soft, non-tender, no guarding/rebound. Bowel sounds present.  No hepatosplenomegaly.  No hernias.  Skin: Warm, dry, no rashes.  Musculoskeletal: Gait is normal.  Moves all extremities well.    Extremities: No clubbing, cyanosis or edema noted.   Neurological: No tremors, sensation grossly intact, tone/strength normal, gait is normal, CN2-12 intact.  Psych:  Affect/mood is normal, judgement is good, memory is intact, grooming is appropriate.    Assessment/Plan:     1. Need for vaccination  - INFLUENZA VACCINE QUAD INJ (PF)    2. Hepatic cyst  - US-ABDOMEN COMPLETE SURVEY; Future  Will continue surveillance per pt request     3. Mixed hyperlipidemia  - rosuvastatin (CRESTOR) 10 MG Tab; TAKE 1 TABLET BY MOUTH EVERY EVENING  Dispense: 90 Tablet; Refill: 3    4. Breast calcifications on mammogram  Continue with annual mammograms    5. Obesity (BMI 30-39.9)  Continue with lifestyle modifications, diet and exercise    6. MAXX on  CPAP  Not currently using CPAP  Discussed with patient importance to get back to using it nightly     7. Severe episode of recurrent major depressive disorder, without psychotic features (HCC)  Continue to monitor  Continue with prozac  Pt deferred behavioral health today    8. Abnormal Pap smear of vagina  Patient has hysterectomy at 38 but prefers to continue with paps    9. Grieving  Continue to monitor    10. Skin abnormality  - Referral to Dermatology   requesting dermatology referral for skin spot treatment to right cheek     Discussed with patient possible alternative diagnoses, patient is to take all medications as prescribed.     If symptoms persist FU w/PCP, if symptoms worsen go to emergency room.     If experiencing any side effects from prescribed medications reports to the office immediately or go to emergency room.    Reviewed indication, dosage, usage and potential adverse effects of prescribed medications.     Reviewed risks and benefits of treatment plan. Patient verbalizes understanding of all instruction and verbally agrees to plan.    Discussed plan with the patient, and she agrees to the above.      I personally reviewed prior external notes and test results pertinent to today's visit.        Return in about 9 months (around 8/16/2022) for annual physical.    My total time spent caring for the patient on the day of the encounter was 29 minutes.   This does not include time spent on separately billable procedures/tests.     Please note that this dictation was created using voice recognition software. I have made every reasonable attempt to correct obvious errors, but I expect that there may be errors of grammar and possibly content that I did not discover before finalizing the note.

## 2021-11-22 NOTE — ASSESSMENT & PLAN NOTE
Chronic and stable condition   Has hystreectomy at 37yo  Due to HOV  Does get well womens exams, most recent 9/2021 which came back normal

## 2021-11-22 NOTE — ASSESSMENT & PLAN NOTE
Chronic and stable condition   Stable mammograms   9/2021 mammogram below  IMPRESSION:        1.  Breasts have scattered areas of fibroglandular density, with no radiographic evidence of malignancy and no interval change.     2.  Screening mammogram in one year is recommended.

## 2021-11-22 NOTE — ASSESSMENT & PLAN NOTE
Chronic and stable condition   Controlled with rosuvastatin nightly     Results for NATALIE GARCIA (MRN [id number]) as of 11/22/2021 13:45   Ref. Range 9/21/2021 11:04   Cholesterol,Tot Latest Ref Range: 100 - 199 mg/dL 200 (H)   Triglycerides Latest Ref Range: 0 - 149 mg/dL 128   HDL Latest Ref Range: >=40 mg/dL 80   LDL Latest Ref Range: <100 mg/dL 94

## 2021-11-22 NOTE — ASSESSMENT & PLAN NOTE
Chronic and stable condition   Has a CPAP but does not use it as much  Stopped using it after her  passed  Denies fatigue, waking up in middle of night, orthopnea

## 2021-11-22 NOTE — ASSESSMENT & PLAN NOTE
Improving.   Followed with therapists  Continues to girve  but is woring thro it  Takes 30mg prozac

## 2021-11-23 RX ORDER — FLUOXETINE HYDROCHLORIDE 20 MG/1
20 CAPSULE ORAL
Qty: 90 CAPSULE | Refills: 0 | Status: SHIPPED | OUTPATIENT
Start: 2021-11-23 | End: 2022-03-21

## 2021-11-23 RX ORDER — FLUOXETINE 10 MG/1
10 CAPSULE ORAL DAILY
Qty: 90 CAPSULE | Refills: 0 | Status: SHIPPED | OUTPATIENT
Start: 2021-11-23 | End: 2022-03-21

## 2021-11-23 NOTE — TELEPHONE ENCOUNTER
Was the patient seen in the last year in this department? 11/22/21    Does patient have an active prescription for medications requested? Yes    Received Request Via: Pharmacy    Hospital Outpatient Visit on 09/21/2021   Component Date Value   • 25-Hydroxy   Vitamin D 25 09/21/2021 23*   • Free T-4 09/21/2021 1.12    • TSH 09/21/2021 0.780    • Cholesterol,Tot 09/21/2021 200*   • Triglycerides 09/21/2021 128    • HDL 09/21/2021 80    • LDL 09/21/2021 94    • Sodium 09/21/2021 139    • Potassium 09/21/2021 4.1    • Chloride 09/21/2021 104    • Co2 09/21/2021 25    • Anion Gap 09/21/2021 10.0    • Glucose 09/21/2021 91    • Bun 09/21/2021 10    • Creatinine 09/21/2021 0.49*   • Calcium 09/21/2021 9.5    • AST(SGOT) 09/21/2021 21    • ALT(SGPT) 09/21/2021 21    • Alkaline Phosphatase 09/21/2021 79    • Total Bilirubin 09/21/2021 0.5    • Albumin 09/21/2021 4.2    • Total Protein 09/21/2021 7.5    • Globulin 09/21/2021 3.3    • A-G Ratio 09/21/2021 1.3    • WBC 09/21/2021 6.4    • RBC 09/21/2021 4.10*   • Hemoglobin 09/21/2021 12.6    • Hematocrit 09/21/2021 39.4    • MCV 09/21/2021 96.1    • MCH 09/21/2021 30.7    • MCHC 09/21/2021 32.0*   • RDW 09/21/2021 44.6    • Platelet Count 09/21/2021 205    • MPV 09/21/2021 13.1*   • Neutrophils-Polys 09/21/2021 44.80    • Lymphocytes 09/21/2021 42.50*   • Monocytes 09/21/2021 8.50    • Eosinophils 09/21/2021 3.60    • Basophils 09/21/2021 0.60    • Immature Granulocytes 09/21/2021 0.00    • Nucleated RBC 09/21/2021 0.00    • Neutrophils (Absolute) 09/21/2021 2.86    • Lymphs (Absolute) 09/21/2021 2.71    • Monos (Absolute) 09/21/2021 0.54    • Eos (Absolute) 09/21/2021 0.23    • Baso (Absolute) 09/21/2021 0.04    • Immature Granulocytes (a* 09/21/2021 0.00    • NRBC (Absolute) 09/21/2021 0.00    • Fasting Status 09/21/2021 Fasting    • GFR If  09/21/2021 >60    • GFR If Non  Ameri* 09/21/2021 >60    Hospital Outpatient Visit on 08/31/2021   Component  Date Value   • Cytology Reg 08/31/2021 See Path Report    • Source 08/31/2021 Vaginal    • HPV Genotype 16 08/31/2021 Negative    • HPV Genotype 18 08/31/2021 Negative    • HPV Other High Risk Rohini* 08/31/2021 Negative

## 2021-12-13 ENCOUNTER — HOSPITAL ENCOUNTER (OUTPATIENT)
Dept: RADIOLOGY | Facility: MEDICAL CENTER | Age: 61
End: 2021-12-13
Attending: NURSE PRACTITIONER
Payer: OTHER GOVERNMENT

## 2021-12-13 DIAGNOSIS — K76.89 HEPATIC CYST: ICD-10-CM

## 2021-12-13 PROCEDURE — 76705 ECHO EXAM OF ABDOMEN: CPT

## 2022-01-24 ENCOUNTER — OFFICE VISIT (OUTPATIENT)
Dept: MEDICAL GROUP | Facility: PHYSICIAN GROUP | Age: 62
End: 2022-01-24
Payer: OTHER GOVERNMENT

## 2022-01-24 VITALS
HEART RATE: 72 BPM | WEIGHT: 171.2 LBS | TEMPERATURE: 97.1 F | HEIGHT: 60 IN | SYSTOLIC BLOOD PRESSURE: 118 MMHG | OXYGEN SATURATION: 95 % | RESPIRATION RATE: 16 BRPM | BODY MASS INDEX: 33.61 KG/M2 | DIASTOLIC BLOOD PRESSURE: 76 MMHG

## 2022-01-24 DIAGNOSIS — F33.2 SEVERE EPISODE OF RECURRENT MAJOR DEPRESSIVE DISORDER, WITHOUT PSYCHOTIC FEATURES (HCC): ICD-10-CM

## 2022-01-24 PROCEDURE — 99213 OFFICE O/P EST LOW 20 MIN: CPT | Performed by: NURSE PRACTITIONER

## 2022-01-24 ASSESSMENT — PATIENT HEALTH QUESTIONNAIRE - PHQ9: CLINICAL INTERPRETATION OF PHQ2 SCORE: 0

## 2022-01-24 ASSESSMENT — FIBROSIS 4 INDEX: FIB4 SCORE: 1.41

## 2022-01-25 ASSESSMENT — ENCOUNTER SYMPTOMS
NERVOUS/ANXIOUS: 0
WEIGHT LOSS: 0
DEPRESSION: 0
CHILLS: 0
FEVER: 0

## 2022-01-25 NOTE — PROGRESS NOTES
"CC:  Chief Complaint   Patient presents with   • Other     PROZAC \"maybe lower the dose\"       HISTORY OF PRESENT ILLNESS: Patient is a 61 y.o. female established patient presenting for discussion of prozac and decreased orgasm      Episode of recurrent major depressive disorder (HCC)  Chronic and stable condition   Is currently taking 30mg Prozac daily   States she is in a new relationship and has noticed she is having difficulty with reaching an orgasm and think it is due to the prozac.  She is interested in either changing her medication or changing her dose of the prozac.   She is feeling like the dosing down may be best option as she feels the prozac has been beneficial.       Patient Active Problem List    Diagnosis Date Noted   • Abnormal Pap smear of vagina 06/01/2020   • Grieving 06/27/2018   • Mixed hyperlipidemia 08/23/2017   • Hepatic cyst 08/23/2017   • Breast calcifications on mammogram 08/23/2017   • Obesity (BMI 30-39.9) 08/23/2017   • MAXX on CPAP 08/23/2017   • Episode of recurrent major depressive disorder (HCC) 08/23/2017      Allergies:Sulfa drugs    Current Outpatient Medications   Medication Sig Dispense Refill   • FLUoxetine (PROZAC) 20 MG Cap Take 1 Capsule by mouth every day. 90 Capsule 0   • FLUoxetine (PROZAC) 10 MG Cap Take 1 Capsule by mouth every day. 90 Capsule 0   • rosuvastatin (CRESTOR) 10 MG Tab TAKE 1 TABLET BY MOUTH EVERY EVENING 90 Tablet 3   • Multiple Vitamin (MULTIVITAMIN ADULT PO) Take  by mouth.     • fluticasone (FLONASE) 50 MCG/ACT nasal spray SHAKE LIQUID AND USE 1 SPRAY IN EACH NOSTRIL EVERY DAY 16 g 6   • hydrOXYzine HCl (ATARAX) 25 MG Tab Take 1 Tablet by mouth 2 times a day as needed for Anxiety. 60 Tablet 1     No current facility-administered medications for this visit.       Social History     Tobacco Use   • Smoking status: Never Smoker   • Smokeless tobacco: Never Used   Vaping Use   • Vaping Use: Never used   Substance Use Topics   • Alcohol use: Yes     " Alcohol/week: 1.2 oz     Types: 2 Glasses of wine per week     Comment: socially    • Drug use: No     Social History     Social History Narrative   • Not on file       Family History   Problem Relation Age of Onset   • Cancer Mother         lung, bladder, melanoma   • Hyperlipidemia Mother    • Alcohol/Drug Father    • Alcohol/Drug Sister    • Alcohol/Drug Brother    • Alcohol/Drug Brother    • No Known Problems Son    • No Known Problems Daughter         Review of Systems   Constitutional: Negative for chills, fever and weight loss.   Psychiatric/Behavioral: Negative for depression and suicidal ideas. The patient is not nervous/anxious.              - NOTE: All other systems reviewed and are negative, except as in HPI.      Exam:    /76   Pulse 72   Temp 36.2 °C (97.1 °F) (Temporal)   Resp 16   Ht 1.524 m (5')   Wt 77.7 kg (171 lb 3.2 oz)   SpO2 95%  Body mass index is 33.44 kg/m².    General: Alert, pleasant, NAD  EYES:   PERRL, EOMI, no icterus or pallor.  Conjunctivae and lids normal.   HENT:  Normocephalic.  External ears normal.   Respiratory: Normal respiratory effort.    Musculoskeletal: Gait is normal.  Moves all extremities well.    Psych:  Affect/mood is normal, judgement is good, memory is intact, grooming is appropriate.    Assessment/Plan:  1. Severe episode of recurrent major depressive disorder, without psychotic features (HCC)  We will change her dose to 20mg daily from the 30mg  Will follow up in 2 months to see if there is a change.   Possibility to go to 10mg if she tolerates dose change or even change medication to something else that she may see a improvement in her orgasm       Discussed with patient possible alternative diagnoses, patient is to take all medications as prescribed.     If symptoms persist FU w/PCP, if symptoms worsen go to emergency room.     If experiencing any side effects from prescribed medications reports to the office immediately or go to emergency  room.    Reviewed indication, dosage, usage and potential adverse effects of prescribed medications.     Reviewed risks and benefits of treatment plan. Patient verbalizes understanding of all instruction and verbally agrees to plan.      Return in about 8 weeks (around 3/21/2022) for efficacy for medication change.    My total time spent caring for the patient on the day of the encounter was 29 minutes.   This does not include time spent on separately billable procedures/tests.     Please note that this dictation was created using voice recognition software. I have made every reasonable attempt to correct obvious errors, but I expect that there are errors of grammar and possibly content that I did not discover before finalizing the note.

## 2022-01-25 NOTE — ASSESSMENT & PLAN NOTE
Chronic and stable condition   Is currently taking 30mg Prozac daily   States she is in a new relationship and has noticed she is having difficulty with reaching an orgasm and think it is due to the prozac.  She is interested in either changing her medication or changing her dose of the prozac.   She is feeling like the dosing down may be best option as she feels the prozac has been beneficial.

## 2022-03-14 ENCOUNTER — APPOINTMENT (RX ONLY)
Dept: URBAN - METROPOLITAN AREA CLINIC 31 | Facility: CLINIC | Age: 62
Setting detail: DERMATOLOGY
End: 2022-03-14

## 2022-03-14 DIAGNOSIS — I78.1 NEVUS, NON-NEOPLASTIC: ICD-10-CM

## 2022-03-14 DIAGNOSIS — L81.4 OTHER MELANIN HYPERPIGMENTATION: ICD-10-CM

## 2022-03-14 DIAGNOSIS — D18.0 HEMANGIOMA: ICD-10-CM

## 2022-03-14 DIAGNOSIS — Z71.89 OTHER SPECIFIED COUNSELING: ICD-10-CM

## 2022-03-14 DIAGNOSIS — L82.1 OTHER SEBORRHEIC KERATOSIS: ICD-10-CM

## 2022-03-14 DIAGNOSIS — D22 MELANOCYTIC NEVI: ICD-10-CM

## 2022-03-14 PROBLEM — D22.61 MELANOCYTIC NEVI OF RIGHT UPPER LIMB, INCLUDING SHOULDER: Status: ACTIVE | Noted: 2022-03-14

## 2022-03-14 PROBLEM — D22.72 MELANOCYTIC NEVI OF LEFT LOWER LIMB, INCLUDING HIP: Status: ACTIVE | Noted: 2022-03-14

## 2022-03-14 PROBLEM — D22.62 MELANOCYTIC NEVI OF LEFT UPPER LIMB, INCLUDING SHOULDER: Status: ACTIVE | Noted: 2022-03-14

## 2022-03-14 PROBLEM — D22.71 MELANOCYTIC NEVI OF RIGHT LOWER LIMB, INCLUDING HIP: Status: ACTIVE | Noted: 2022-03-14

## 2022-03-14 PROBLEM — D22.5 MELANOCYTIC NEVI OF TRUNK: Status: ACTIVE | Noted: 2022-03-14

## 2022-03-14 PROBLEM — D18.01 HEMANGIOMA OF SKIN AND SUBCUTANEOUS TISSUE: Status: ACTIVE | Noted: 2022-03-14

## 2022-03-14 PROCEDURE — 99203 OFFICE O/P NEW LOW 30 MIN: CPT

## 2022-03-14 PROCEDURE — ? ADDITIONAL NOTES

## 2022-03-14 PROCEDURE — ? COUNSELING

## 2022-03-14 ASSESSMENT — LOCATION DETAILED DESCRIPTION DERM
LOCATION DETAILED: RIGHT LATERAL MALAR CHEEK
LOCATION DETAILED: MIDDLE STERNUM
LOCATION DETAILED: LEFT PROXIMAL POSTERIOR UPPER ARM
LOCATION DETAILED: RIGHT CENTRAL MALAR CHEEK
LOCATION DETAILED: RIGHT PROXIMAL DORSAL FOREARM
LOCATION DETAILED: LEFT VENTRAL DISTAL FOREARM
LOCATION DETAILED: LEFT DISTAL DORSAL FOREARM
LOCATION DETAILED: RIGHT CENTRAL MALAR CHEEK
LOCATION DETAILED: LEFT PROXIMAL CALF
LOCATION DETAILED: RIGHT ULNAR DORSAL HAND
LOCATION DETAILED: RIGHT ANTERIOR DISTAL THIGH
LOCATION DETAILED: LEFT SUPERIOR MEDIAL UPPER BACK
LOCATION DETAILED: LEFT PROXIMAL DORSAL FOREARM
LOCATION DETAILED: LEFT CENTRAL MALAR CHEEK
LOCATION DETAILED: RIGHT PROXIMAL CALF
LOCATION DETAILED: RIGHT VENTRAL DISTAL FOREARM
LOCATION DETAILED: LEFT ANTERIOR DISTAL UPPER ARM
LOCATION DETAILED: LEFT INFERIOR UPPER BACK
LOCATION DETAILED: RIGHT ANTERIOR DISTAL UPPER ARM
LOCATION DETAILED: RIGHT INFERIOR MEDIAL UPPER BACK
LOCATION DETAILED: PERIUMBILICAL SKIN
LOCATION DETAILED: RIGHT POSTERIOR SHOULDER
LOCATION DETAILED: LEFT RADIAL DORSAL HAND
LOCATION DETAILED: LEFT PROXIMAL DORSAL FOREARM
LOCATION DETAILED: EPIGASTRIC SKIN
LOCATION DETAILED: INFERIOR THORACIC SPINE
LOCATION DETAILED: LEFT POSTERIOR SHOULDER
LOCATION DETAILED: RIGHT PROXIMAL POSTERIOR UPPER ARM
LOCATION DETAILED: LEFT CENTRAL MALAR CHEEK
LOCATION DETAILED: LEFT ANTERIOR DISTAL THIGH
LOCATION DETAILED: RIGHT SUPERIOR MEDIAL MIDBACK
LOCATION DETAILED: LEFT LATERAL MALAR CHEEK

## 2022-03-14 ASSESSMENT — LOCATION SIMPLE DESCRIPTION DERM
LOCATION SIMPLE: LEFT FOREARM
LOCATION SIMPLE: LEFT CALF
LOCATION SIMPLE: RIGHT LOWER BACK
LOCATION SIMPLE: UPPER BACK
LOCATION SIMPLE: RIGHT FOREARM
LOCATION SIMPLE: RIGHT UPPER ARM
LOCATION SIMPLE: LEFT CHEEK
LOCATION SIMPLE: LEFT UPPER ARM
LOCATION SIMPLE: RIGHT UPPER BACK
LOCATION SIMPLE: RIGHT SHOULDER
LOCATION SIMPLE: LEFT THIGH
LOCATION SIMPLE: LEFT FOREARM
LOCATION SIMPLE: LEFT CHEEK
LOCATION SIMPLE: LEFT SHOULDER
LOCATION SIMPLE: RIGHT CHEEK
LOCATION SIMPLE: RIGHT HAND
LOCATION SIMPLE: RIGHT CHEEK
LOCATION SIMPLE: LEFT HAND
LOCATION SIMPLE: CHEST
LOCATION SIMPLE: RIGHT THIGH
LOCATION SIMPLE: LEFT UPPER BACK
LOCATION SIMPLE: RIGHT CALF
LOCATION SIMPLE: ABDOMEN

## 2022-03-14 ASSESSMENT — LOCATION ZONE DERM
LOCATION ZONE: HAND
LOCATION ZONE: FACE
LOCATION ZONE: TRUNK
LOCATION ZONE: TRUNK
LOCATION ZONE: LEG
LOCATION ZONE: FACE
LOCATION ZONE: ARM
LOCATION ZONE: ARM

## 2022-03-14 NOTE — PROCEDURE: ADDITIONAL NOTES
Detail Level: Detailed
Additional Notes: Cosmetic pamphlet provided to pt for cosmetic consultation for trx of spider angioma and solar lentigines
Render Risk Assessment In Note?: no

## 2022-03-14 NOTE — PROCEDURE: MIPS QUALITY
Quality 111:Pneumonia Vaccination Status For Older Adults: Pneumococcal Vaccination Previously Received
Quality 130: Documentation Of Current Medications In The Medical Record: Current Medications Documented
Quality 431: Preventive Care And Screening: Unhealthy Alcohol Use - Screening: Patient not identified as an unhealthy alcohol user when screened for unhealthy alcohol use using a systematic screening method
Quality 110: Preventive Care And Screening: Influenza Immunization: Influenza Immunization Administered during Influenza season
Quality 226: Preventive Care And Screening: Tobacco Use: Screening And Cessation Intervention: Tobacco Screening not Performed for Medical Reasons
Detail Level: Detailed

## 2022-03-21 RX ORDER — FLUOXETINE 10 MG/1
CAPSULE ORAL
Qty: 90 CAPSULE | Refills: 0 | Status: SHIPPED | OUTPATIENT
Start: 2022-03-21 | End: 2022-05-16

## 2022-03-21 RX ORDER — FLUOXETINE HYDROCHLORIDE 20 MG/1
20 CAPSULE ORAL
Qty: 90 CAPSULE | Refills: 0 | Status: SHIPPED | OUTPATIENT
Start: 2022-03-21 | End: 2022-05-16 | Stop reason: SDUPTHER

## 2022-05-16 ENCOUNTER — OFFICE VISIT (OUTPATIENT)
Dept: MEDICAL GROUP | Facility: PHYSICIAN GROUP | Age: 62
End: 2022-05-16
Payer: OTHER GOVERNMENT

## 2022-05-16 VITALS
SYSTOLIC BLOOD PRESSURE: 124 MMHG | TEMPERATURE: 98.9 F | RESPIRATION RATE: 20 BRPM | HEART RATE: 75 BPM | BODY MASS INDEX: 33.57 KG/M2 | DIASTOLIC BLOOD PRESSURE: 80 MMHG | OXYGEN SATURATION: 95 % | WEIGHT: 171 LBS | HEIGHT: 60 IN

## 2022-05-16 DIAGNOSIS — Z12.12 SCREENING FOR COLORECTAL CANCER: ICD-10-CM

## 2022-05-16 DIAGNOSIS — E66.9 OBESITY (BMI 30-39.9): ICD-10-CM

## 2022-05-16 DIAGNOSIS — K76.89 HEPATIC CYST: ICD-10-CM

## 2022-05-16 DIAGNOSIS — E55.9 VITAMIN D DEFICIENCY: ICD-10-CM

## 2022-05-16 DIAGNOSIS — J32.0 CHRONIC MAXILLARY SINUSITIS: ICD-10-CM

## 2022-05-16 DIAGNOSIS — E78.2 MIXED HYPERLIPIDEMIA: ICD-10-CM

## 2022-05-16 DIAGNOSIS — Z12.11 SCREENING FOR COLORECTAL CANCER: ICD-10-CM

## 2022-05-16 DIAGNOSIS — F33.2 SEVERE EPISODE OF RECURRENT MAJOR DEPRESSIVE DISORDER, WITHOUT PSYCHOTIC FEATURES (HCC): ICD-10-CM

## 2022-05-16 DIAGNOSIS — Z00.00 PHYSICAL EXAM, ANNUAL: ICD-10-CM

## 2022-05-16 PROCEDURE — 99214 OFFICE O/P EST MOD 30 MIN: CPT | Performed by: NURSE PRACTITIONER

## 2022-05-16 RX ORDER — FLUOXETINE HYDROCHLORIDE 20 MG/1
20 CAPSULE ORAL
Qty: 90 CAPSULE | Refills: 3 | Status: SHIPPED | OUTPATIENT
Start: 2022-05-16 | End: 2022-11-17 | Stop reason: SDUPTHER

## 2022-05-16 RX ORDER — HYDROXYZINE HYDROCHLORIDE 25 MG/1
25 TABLET, FILM COATED ORAL 2 TIMES DAILY PRN
Qty: 60 TABLET | Refills: 1 | Status: SHIPPED | OUTPATIENT
Start: 2022-05-16 | End: 2022-11-17 | Stop reason: SDUPTHER

## 2022-05-16 RX ORDER — ROSUVASTATIN CALCIUM 10 MG/1
TABLET, COATED ORAL
Qty: 90 TABLET | Refills: 3 | Status: SHIPPED | OUTPATIENT
Start: 2022-05-16 | End: 2022-11-17 | Stop reason: SDUPTHER

## 2022-05-16 RX ORDER — FLUTICASONE PROPIONATE 50 MCG
SPRAY, SUSPENSION (ML) NASAL
Qty: 16 G | Refills: 6 | Status: SHIPPED | OUTPATIENT
Start: 2022-05-16 | End: 2022-11-17 | Stop reason: SDUPTHER

## 2022-05-16 ASSESSMENT — FIBROSIS 4 INDEX: FIB4 SCORE: 1.41

## 2022-05-16 NOTE — ASSESSMENT & PLAN NOTE
Since her last visit patient had decided to decrease her Prozac from 30 mg down to 20 mg due to how it was making her feel with her libido.  She states that since then she has been noticing improvement with her libido as well as feeling that the Prozac continues to work appropriately for her.  She does not currently want to change her medication at this time.  Denies any SI HI.

## 2022-05-16 NOTE — PROGRESS NOTES
CC:  Chief Complaint   Patient presents with   • Follow-Up     PROZAC   • Medication Refill     Rosuvastatin, hydrOXYzine, Prozac, flonase       HISTORY OF PRESENT ILLNESS: Patient is a 61 y.o. female established patient presenting follow-up on Prozac    1. Mixed hyperlipidemia  Chronic and stable condition  Patient continues to take rosuvastatin 10 mg daily  Needs new labs  Denies any myalgias, joint pain    2. Vitamin D deficiency  Chronic and stable condition  Patient continues to take multivitamin daily  Needs new labs    3. Physical exam, annual  Patient needs new labs for September    4. Chronic maxillary sinusitis  Chronic and stable condition  Uses Flonase daily  Denies any congestion, shortness of breath, facial pain    5. Severe episode of recurrent major depressive disorder, without psychotic features (HCC)  Chronic and stable condition  Patient currently takes Prozac 20 mg hydroxyzine 25 mg as needed  Denies SI HI    6. Obesity (BMI 30-39.9)  Chronic and stable condition  BMI 33.4, weight 171  Patient is walking 2-3 times a week however is not increasing her aerobic exercise.    7. Screening for colorectal cancer  Needs new colorectal screening as last one was done possibly in 2009    8. Hepatic cyst  Chronic and stable condition  1.8 cm noted to liver  Last imaging study completed 12/2021 showed stable cyst         Allergies:Sulfa drugs    Current Outpatient Medications   Medication Sig Dispense Refill   • fluticasone (FLONASE) 50 MCG/ACT nasal spray SHAKE LIQUID AND USE 1 SPRAY IN EACH NOSTRIL EVERY DAY 16 g 6   • hydrOXYzine HCl (ATARAX) 25 MG Tab Take 1 Tablet by mouth 2 times a day as needed for Anxiety. 60 Tablet 1   • rosuvastatin (CRESTOR) 10 MG Tab TAKE 1 TABLET BY MOUTH EVERY EVENING 90 Tablet 3   • FLUoxetine (PROZAC) 20 MG Cap Take 1 Capsule by mouth every day. 90 Capsule 3   • Multiple Vitamin (MULTIVITAMIN ADULT PO) Take  by mouth.       No current facility-administered medications for this  visit.       Social History     Tobacco Use   • Smoking status: Never Smoker   • Smokeless tobacco: Never Used   Vaping Use   • Vaping Use: Never used   Substance Use Topics   • Alcohol use: Yes     Alcohol/week: 1.2 oz     Types: 2 Glasses of wine per week     Comment: socially    • Drug use: No     Social History     Social History Narrative   • Not on file       Family History   Problem Relation Age of Onset   • Cancer Mother         lung, bladder, melanoma   • Hyperlipidemia Mother    • Alcohol/Drug Father    • Alcohol/Drug Sister    • Alcohol/Drug Brother    • Alcohol/Drug Brother    • No Known Problems Son    • No Known Problems Daughter         ROS   See HPI      - NOTE: All other systems reviewed and are negative, except as in HPI.      Exam:    /80   Pulse 75   Temp 37.2 °C (98.9 °F) (Temporal)   Resp 20   Ht 1.524 m (5')   Wt 77.6 kg (171 lb)   SpO2 95%  Body mass index is 33.4 kg/m².    General: Alert, pleasant, NAD  EYES:   PERRL, EOMI, no icterus or pallor.  Conjunctivae and lids normal.   HENT:  Normocephalic.  External ears normal.   Heart: Regular rate and rhythm.  S1 and S2 normal.  No murmurs appreciated.  Respiratory: Normal respiratory effort.  Clear to auscultation bilaterally.  Skin: Warm, dry, no rashes.  Musculoskeletal: Gait is normal.  Moves all extremities well.    Extremities: No clubbing, cyanosis or edema noted.   Neurological: No tremors, sensation grossly intact, tone/strength normal, gait is normal, CN2-12 intact.  Psych:  Affect/mood is normal, judgement is good, memory is intact, grooming is appropriate.    Assessment/Plan:  Episode of recurrent major depressive disorder (HCC)  Since her last visit patient had decided to decrease her Prozac from 30 mg down to 20 mg due to how it was making her feel with her libido.  She states that since then she has been noticing improvement with her libido as well as feeling that the Prozac continues to work appropriately for her.   She does not currently want to change her medication at this time.  Denies any SI HI.    Hepatic cyst  Continues to have annual screenings done.  Last screening completed on 12/2021 which showed stable 1.8 cm hepatic cyst.    Mixed hyperlipidemia  Patient currently taking 10 mg rosuvastatin daily.  Liver enzymes are stable.  patient denies any myalgias or joint pain    Obesity (BMI 30-39.9)  BMI 33.4  Weight 171  Patient is wondering what other options she has for weight loss she feels that she has been changing her diet and exercising more since January and has not noticed any change in the way her clothes fit or weight.  She is wondering if there is any medication that she can take it is not like phentermine or any of those to help her lose weight.  We discussed at length the type of exercising and how often throughout the week she should be exercising.  150 minutes throughout the week with 2 days being strength exercise days.  Also discussed with patient options for  Referral to [facility] with Dr. Israel that we can send referrals in for patient is agreeable to this and would like that     1. Mixed hyperlipidemia  Lipid Profile    rosuvastatin (CRESTOR) 10 MG Tab   2. Vitamin D deficiency  VITAMIN D,25 HYDROXY   3. Physical exam, annual  CBC WITHOUT DIFFERENTIAL    Comp Metabolic Panel    Lipid Profile    VITAMIN D,25 HYDROXY   4. Chronic maxillary sinusitis  fluticasone (FLONASE) 50 MCG/ACT nasal spray   5. Severe episode of recurrent major depressive disorder, without psychotic features (HCC)  hydrOXYzine HCl (ATARAX) 25 MG Tab    FLUoxetine (PROZAC) 20 MG Cap   6. Obesity (BMI 30-39.9)  Referral to Other   7. Screening for colorectal cancer  Referral to GI for Colonoscopy   8. Hepatic cyst         Discussed with patient possible alternative diagnoses, patient is to take all medications as prescribed.     If symptoms persist FU w/PCP, if symptoms worsen go to emergency room.     If experiencing any side  effects from prescribed medications reports to the office immediately or go to emergency room.    Reviewed indication, dosage, usage and potential adverse effects of prescribed medications.     Reviewed risks and benefits of treatment plan. Patient verbalizes understanding of all instruction and verbally agrees to plan.      Return in about 4 months (around 9/16/2022) for Annual physical.     Please note that this dictation was created using voice recognition software. I have made every reasonable attempt to correct obvious errors, but I expect that there are errors of grammar and possibly content that I did not discover before finalizing the note.

## 2022-05-16 NOTE — ASSESSMENT & PLAN NOTE
BMI 33.4  Weight 171  Patient is wondering what other options she has for weight loss she feels that she has been changing her diet and exercising more since January and has not noticed any change in the way her clothes fit or weight.  She is wondering if there is any medication that she can take it is not like phentermine or any of those to help her lose weight.  We discussed at length the type of exercising and how often throughout the week she should be exercising.  150 minutes throughout the week with 2 days being strength exercise days.  Also discussed with patient options for  Referral to [facility] with Dr. Israel that we can send referrals in for patient is agreeable to this and would like that

## 2022-05-16 NOTE — ASSESSMENT & PLAN NOTE
Patient currently taking 10 mg rosuvastatin daily.  Liver enzymes are stable.  patient denies any myalgias or joint pain

## 2022-05-16 NOTE — ASSESSMENT & PLAN NOTE
Continues to have annual screenings done.  Last screening completed on 12/2021 which showed stable 1.8 cm hepatic cyst.

## 2022-11-07 ENCOUNTER — HOSPITAL ENCOUNTER (OUTPATIENT)
Dept: RADIOLOGY | Facility: MEDICAL CENTER | Age: 62
End: 2022-11-07
Attending: NURSE PRACTITIONER
Payer: OTHER GOVERNMENT

## 2022-11-07 DIAGNOSIS — Z12.31 ENCOUNTER FOR MAMMOGRAM TO ESTABLISH BASELINE MAMMOGRAM: ICD-10-CM

## 2022-11-07 PROCEDURE — 77063 BREAST TOMOSYNTHESIS BI: CPT

## 2022-11-14 ENCOUNTER — HOSPITAL ENCOUNTER (OUTPATIENT)
Dept: LAB | Facility: MEDICAL CENTER | Age: 62
End: 2022-11-14
Attending: NURSE PRACTITIONER
Payer: OTHER GOVERNMENT

## 2022-11-14 DIAGNOSIS — E55.9 VITAMIN D DEFICIENCY: ICD-10-CM

## 2022-11-14 DIAGNOSIS — E78.2 MIXED HYPERLIPIDEMIA: ICD-10-CM

## 2022-11-14 DIAGNOSIS — Z00.00 PHYSICAL EXAM, ANNUAL: ICD-10-CM

## 2022-11-14 LAB
25(OH)D3 SERPL-MCNC: 29 NG/ML (ref 30–100)
ALBUMIN SERPL BCP-MCNC: 4.5 G/DL (ref 3.2–4.9)
ALBUMIN/GLOB SERPL: 1.3 G/DL
ALP SERPL-CCNC: 86 U/L (ref 30–99)
ALT SERPL-CCNC: 24 U/L (ref 2–50)
ANION GAP SERPL CALC-SCNC: 13 MMOL/L (ref 7–16)
AST SERPL-CCNC: 21 U/L (ref 12–45)
BILIRUB SERPL-MCNC: 0.6 MG/DL (ref 0.1–1.5)
BUN SERPL-MCNC: 13 MG/DL (ref 8–22)
CALCIUM SERPL-MCNC: 9.6 MG/DL (ref 8.4–10.2)
CHLORIDE SERPL-SCNC: 100 MMOL/L (ref 96–112)
CHOLEST SERPL-MCNC: 201 MG/DL (ref 100–199)
CO2 SERPL-SCNC: 23 MMOL/L (ref 20–33)
CREAT SERPL-MCNC: 0.62 MG/DL (ref 0.5–1.4)
ERYTHROCYTE [DISTWIDTH] IN BLOOD BY AUTOMATED COUNT: 42.6 FL (ref 35.9–50)
FASTING STATUS PATIENT QL REPORTED: NORMAL
GFR SERPLBLD CREATININE-BSD FMLA CKD-EPI: 99 ML/MIN/1.73 M 2
GLOBULIN SER CALC-MCNC: 3.6 G/DL (ref 1.9–3.5)
GLUCOSE SERPL-MCNC: 98 MG/DL (ref 65–99)
HCT VFR BLD AUTO: 41.1 % (ref 37–47)
HDLC SERPL-MCNC: 80 MG/DL
HGB BLD-MCNC: 13.5 G/DL (ref 12–16)
LDLC SERPL CALC-MCNC: 98 MG/DL
MCH RBC QN AUTO: 30.5 PG (ref 27–33)
MCHC RBC AUTO-ENTMCNC: 32.8 G/DL (ref 33.6–35)
MCV RBC AUTO: 92.8 FL (ref 81.4–97.8)
PLATELET # BLD AUTO: 265 K/UL (ref 164–446)
PMV BLD AUTO: 10.7 FL (ref 9–12.9)
POTASSIUM SERPL-SCNC: 4.4 MMOL/L (ref 3.6–5.5)
PROT SERPL-MCNC: 8.1 G/DL (ref 6–8.2)
RBC # BLD AUTO: 4.43 M/UL (ref 4.2–5.4)
SODIUM SERPL-SCNC: 136 MMOL/L (ref 135–145)
TRIGL SERPL-MCNC: 115 MG/DL (ref 0–149)
WBC # BLD AUTO: 5.9 K/UL (ref 4.8–10.8)

## 2022-11-14 PROCEDURE — 80061 LIPID PANEL: CPT

## 2022-11-14 PROCEDURE — 82306 VITAMIN D 25 HYDROXY: CPT

## 2022-11-14 PROCEDURE — 36415 COLL VENOUS BLD VENIPUNCTURE: CPT

## 2022-11-14 PROCEDURE — 80053 COMPREHEN METABOLIC PANEL: CPT

## 2022-11-14 PROCEDURE — 85027 COMPLETE CBC AUTOMATED: CPT

## 2022-11-17 ENCOUNTER — OFFICE VISIT (OUTPATIENT)
Dept: MEDICAL GROUP | Facility: PHYSICIAN GROUP | Age: 62
End: 2022-11-17
Payer: OTHER GOVERNMENT

## 2022-11-17 VITALS
WEIGHT: 172.8 LBS | HEART RATE: 99 BPM | HEIGHT: 60 IN | OXYGEN SATURATION: 95 % | SYSTOLIC BLOOD PRESSURE: 118 MMHG | DIASTOLIC BLOOD PRESSURE: 68 MMHG | BODY MASS INDEX: 33.92 KG/M2 | TEMPERATURE: 99.3 F | RESPIRATION RATE: 12 BRPM

## 2022-11-17 DIAGNOSIS — J32.0 CHRONIC MAXILLARY SINUSITIS: ICD-10-CM

## 2022-11-17 DIAGNOSIS — E66.9 OBESITY (BMI 30-39.9): ICD-10-CM

## 2022-11-17 DIAGNOSIS — E78.2 MIXED HYPERLIPIDEMIA: ICD-10-CM

## 2022-11-17 DIAGNOSIS — F33.2 SEVERE EPISODE OF RECURRENT MAJOR DEPRESSIVE DISORDER, WITHOUT PSYCHOTIC FEATURES (HCC): ICD-10-CM

## 2022-11-17 PROCEDURE — 99396 PREV VISIT EST AGE 40-64: CPT | Performed by: NURSE PRACTITIONER

## 2022-11-17 RX ORDER — HYDROXYZINE HYDROCHLORIDE 25 MG/1
25 TABLET, FILM COATED ORAL 2 TIMES DAILY PRN
Qty: 60 TABLET | Refills: 1 | Status: SHIPPED | OUTPATIENT
Start: 2022-11-17 | End: 2023-05-17

## 2022-11-17 RX ORDER — ROSUVASTATIN CALCIUM 10 MG/1
TABLET, COATED ORAL
Qty: 90 TABLET | Refills: 3 | Status: SHIPPED | OUTPATIENT
Start: 2022-11-17 | End: 2023-01-16

## 2022-11-17 RX ORDER — FLUTICASONE PROPIONATE 50 MCG
SPRAY, SUSPENSION (ML) NASAL
Qty: 16 G | Refills: 6 | Status: SHIPPED | OUTPATIENT
Start: 2022-11-17 | End: 2024-02-08 | Stop reason: SDUPTHER

## 2022-11-17 RX ORDER — FLUOXETINE HYDROCHLORIDE 20 MG/1
20 CAPSULE ORAL
Qty: 90 CAPSULE | Refills: 3 | Status: SHIPPED | OUTPATIENT
Start: 2022-11-17 | End: 2023-06-27

## 2022-11-17 ASSESSMENT — PATIENT HEALTH QUESTIONNAIRE - PHQ9
2. FEELING DOWN, DEPRESSED, IRRITABLE, OR HOPELESS: NOT AT ALL
4. FEELING TIRED OR HAVING LITTLE ENERGY: NOT AT ALL
8. MOVING OR SPEAKING SO SLOWLY THAT OTHER PEOPLE COULD HAVE NOTICED. OR THE OPPOSITE, BEING SO FIGETY OR RESTLESS THAT YOU HAVE BEEN MOVING AROUND A LOT MORE THAN USUAL: NOT AT ALL
3. TROUBLE FALLING OR STAYING ASLEEP OR SLEEPING TOO MUCH: NOT AT ALL
9. THOUGHTS THAT YOU WOULD BE BETTER OFF DEAD, OR OF HURTING YOURSELF: NOT AT ALL
6. FEELING BAD ABOUT YOURSELF - OR THAT YOU ARE A FAILURE OR HAVE LET YOURSELF OR YOUR FAMILY DOWN: NOT AL ALL
SUM OF ALL RESPONSES TO PHQ9 QUESTIONS 1 AND 2: 0
5. POOR APPETITE OR OVEREATING: NOT AT ALL
7. TROUBLE CONCENTRATING ON THINGS, SUCH AS READING THE NEWSPAPER OR WATCHING TELEVISION: NOT AT ALL
SUM OF ALL RESPONSES TO PHQ QUESTIONS 1-9: 0
1. LITTLE INTEREST OR PLEASURE IN DOING THINGS: NOT AT ALL

## 2022-11-17 ASSESSMENT — FIBROSIS 4 INDEX: FIB4 SCORE: 1.04

## 2022-11-17 NOTE — LETTER
[facility]  [facility]  [address]  [place] [address]  Fax: [phone]   Authorization for Release/Disclosure of   Protected Health Information   Name: RIMA POPE : 1960 SSN: [id number]   Address: [address] [place] [address] [place] [address] Phone:    [phone] (home)    I authorize the entity listed below to release/disclose the PHI below to:   [facility] and [facility]   Provider or Entity Name:   GI   Address   City, State, Zip   Phone:      Fax:     Reason for request: continuity of care   Information to be released:    [ x ] LAST COLONOSCOPY,  including any PATH REPORT and follow-up  [  ] LAST FIT/COLOGUARD RESULT [  ] LAST DEXA  [  ] LAST MAMMOGRAM  [  ] LAST PAP  [  ] LAST LABS [  ] RETINA EXAM REPORT  [  ] IMMUNIZATION RECORDS  [  ] Release all info      [  ] Check here and initial the line next to each item to release ALL health information INCLUDING  _____ Care and treatment for drug and / or alcohol abuse  _____ HIV testing, infection status, or AIDS  _____ Genetic Testing    DATES OF SERVICE OR TIME PERIOD TO BE DISCLOSED: _____________  I understand and acknowledge that:  * This Authorization may be revoked at any time by you in writing, except if your health information has already been used or disclosed.  * Your health information that will be used or disclosed as a result of you signing this authorization could be re-disclosed by the recipient. If this occurs, your re-disclosed health information may no longer be protected by State or Federal laws.  * You may refuse to sign this Authorization. Your refusal will not affect your ability to obtain treatment.  * This Authorization becomes effective upon signing and will  on (date) __________.      If no date is indicated, this Authorization will  one (1) year from the signature date.    Name: Rima Pope    Signature:   Date:     2022        PLEASE FAX REQUESTED RECORDS BACK TO: [phone]

## 2022-11-17 NOTE — PROGRESS NOTES
Subjective:     CC:   Chief Complaint   Patient presents with    Lab Results    Follow-Up    Medication Refill     All       HPI:   Rima Pope is a 62 y.o. female who presents for annual exam. She is feeling well and denies any complaints.      Anticipatory Guidance   Advanced directive: NA   Osteoporosis Screen/ DEXA: NA   PT/vit D for falls prevention: NA   Cholesterol Screening: Completed 2022   Diabetes Screening: Completed 2022   Diet: Patient is working on modifying her diet and cutting out carbohydrates.  Patient is also working on fasting from after dinner until 1:00 in the afternoon   Exercise: Patient is working on becoming more active and exercising   Substance Abuse: none   Safe in relationship.   Seat belts, bike/motorcycle  helmet, gun safety discussed.  Sun protection used.  Dentist: follows up with dentist Q 6 months- Karthik  Eye doctor: No optometrist    Cancer screening  Colorectal Cancer Screening: completed 10/31/2022    Lung Cancer Screening: NA    Cervical Cancer Screenin2023   Breast Cancer Screening: Completed 2023     Infectious disease screening/Immunizations  --Immunizations:    Influenza: completed     HPV:  NA    Tetanus: UTD    Shingles: n/a    Pneumococcal : NA        Her preventative health screens are up to date.        She  has a past medical history of Abnormal mammogram of left breast (2018), Allergic rhinitis, Anxiety, Hyperlipidemia, and Nasal drainage.  She  has a past surgical history that includes primary c section; abdominal hysterectomy total; hernia repair; and hysterectomy laparoscopy.    Family History   Problem Relation Age of Onset    Cancer Mother         lung, bladder, melanoma    Hyperlipidemia Mother     Alcohol/Drug Father     Alcohol/Drug Sister     Alcohol/Drug Brother     Alcohol/Drug Brother     No Known Problems Son     No Known Problems Daughter        Social History     Socioeconomic History    Marital status:      Spouse  name: Not on file    Number of children: Not on file    Years of education: Not on file    Highest education level: Not on file   Occupational History    Not on file   Tobacco Use    Smoking status: Never    Smokeless tobacco: Never   Vaping Use    Vaping Use: Never used   Substance and Sexual Activity    Alcohol use: Yes     Alcohol/week: 1.2 oz     Types: 2 Glasses of wine per week     Comment: socially     Drug use: No    Sexual activity: Yes     Partners: Male   Other Topics Concern    Not on file   Social History Narrative    Not on file     Social Determinants of Health     Financial Resource Strain: Not on file   Food Insecurity: Not on file   Transportation Needs: Not on file   Physical Activity: Not on file   Stress: Not on file   Social Connections: Not on file   Intimate Partner Violence: Not on file   Housing Stability: Not on file       Patient Active Problem List    Diagnosis Date Noted    Abnormal Pap smear of vagina 06/01/2020    Grieving 06/27/2018    Mixed hyperlipidemia 08/23/2017    Hepatic cyst 08/23/2017    Breast calcifications on mammogram 08/23/2017    Obesity (BMI 30-39.9) 08/23/2017    MAXX on CPAP 08/23/2017    Episode of recurrent major depressive disorder (HCC) 08/23/2017         Current Outpatient Medications   Medication Sig Dispense Refill    FLUoxetine (PROZAC) 20 MG Cap Take 1 Capsule by mouth every day. 90 Capsule 3    fluticasone (FLONASE) 50 MCG/ACT nasal spray SHAKE LIQUID AND USE 1 SPRAY IN EACH NOSTRIL EVERY DAY 16 g 6    hydrOXYzine HCl (ATARAX) 25 MG Tab Take 1 Tablet by mouth 2 times a day as needed for Anxiety. 60 Tablet 1    rosuvastatin (CRESTOR) 10 MG Tab TAKE 1 TABLET BY MOUTH EVERY EVENING 90 Tablet 3    Multiple Vitamin (MULTIVITAMIN ADULT PO) Take  by mouth.       No current facility-administered medications for this visit.     Allergies: Sulfa drugs       ROS     Review of systems (+10 systems) unremarkable except for concerns noted by patient or items  listed.    Please see HPI for additional ROS.   Objective:     /68 (BP Location: Left arm, Patient Position: Sitting, BP Cuff Size: Adult)   Pulse 99   Temp 37.4 °C (99.3 °F) (Temporal)   Resp 12   Ht 1.524 m (5')   Wt 78.4 kg (172 lb 12.8 oz)   LMP 09/24/1999   SpO2 95%   BMI 33.75 kg/m²   Body mass index is 33.75 kg/m².  Wt Readings from Last 4 Encounters:   11/17/22 78.4 kg (172 lb 12.8 oz)   05/16/22 77.6 kg (171 lb)   01/24/22 77.7 kg (171 lb 3.2 oz)   11/22/21 76.4 kg (168 lb 6.4 oz)       Physical Exam:  Constitutional: Well-developed and well-nourished. Not diaphoretic. No distress.   Skin: Skin is warm and dry. No rash noted.  Head: Atraumatic without lesions.  Eyes: Conjunctivae and extraocular motions are normal. Pupils are equal, round, and reactive to light. No scleral icterus.   Ears:  External ears unremarkable. Tympanic membranes clear and intact.  Nose: Nares patent. Septum midline. Turbinates without erythema nor edema. No discharge.   Mouth/Throat: Dentition is good. Tongue normal. Oropharynx is clear and moist. Posterior pharynx without erythema or exudates.  Neck: Supple, trachea midline. Normal range of motion. No thyromegaly present. No lymphadenopathy--cervical or supraclavicular.  Cardiovascular: Regular rate and rhythm, S1 and S2 without murmur, rubs, or gallops.  Lungs: Normal inspiratory effort, CTA bilaterally, no wheezes/rhonchi/rales  Extremities: No cyanosis, clubbing, erythema, nor edema. Distal pulses intact and symmetric.   Musculoskeletal: All major joints AROM full in all directions without pain.  Neurological: Alert and oriented x 3. No cranial nerve deficit. 5/5 myotomes. Sensation intact.   Psychiatric:  Behavior, mood, and affect are appropriate.    Assessment and Plan:     Assessment/Plan:  Mixed hyperlipidemia  Chronic and stable condition.  Patient currently takes 10 mg rosuvastatin every night  Recent labs show stable liver enzymes and stable lipid  panel.    Obesity (BMI 30-39.9)  Chronic and stable condition.  Patient continues to work on diet and exercise and has lost a total of 3 pounds per her home scale.  She is working on decreasing and better improving her diet as well as increasing her exercise.   1. Severe episode of recurrent major depressive disorder, without psychotic features (HCC)  FLUoxetine (PROZAC) 20 MG Cap    hydrOXYzine HCl (ATARAX) 25 MG Tab      2. Chronic maxillary sinusitis  fluticasone (FLONASE) 50 MCG/ACT nasal spray      3. Mixed hyperlipidemia  rosuvastatin (CRESTOR) 10 MG Tab      4. Obesity (BMI 30-39.9)           Discussed with patient possible alternative diagnoses, patient is to take all medications as prescribed.     If symptoms persist FU w/PCP, if symptoms worsen go to emergency room.     If experiencing any side effects from prescribed medications reports to the office immediately or go to emergency room.    Reviewed indication, dosage, usage and potential adverse effects of prescribed medications.     Reviewed risks and benefits of treatment plan. Patient verbalizes understanding of all instruction and verbally agrees to plan.    HCM: Up-to-date  Labs per orders  Immunizations per orders  Patient counseled about skin care, diet, supplements, prenatal vitamins, safe sex and exercise.      Return in about 6 months (around 5/17/2023).      Please note that this dictation was created using voice recognition software. I have made every reasonable attempt to correct obvious errors, but I expect that there are errors of grammar and possibly content that I did not discover before finalizing the note.

## 2022-11-17 NOTE — ASSESSMENT & PLAN NOTE
Chronic and stable condition.  Patient continues to work on diet and exercise and has lost a total of 3 pounds per her home scale.  She is working on decreasing and better improving her diet as well as increasing her exercise.

## 2022-11-17 NOTE — ASSESSMENT & PLAN NOTE
Chronic and stable condition.  Patient currently takes 10 mg rosuvastatin every night  Recent labs show stable liver enzymes and stable lipid panel.

## 2023-01-16 DIAGNOSIS — E78.2 MIXED HYPERLIPIDEMIA: ICD-10-CM

## 2023-01-16 RX ORDER — ROSUVASTATIN CALCIUM 10 MG/1
TABLET, COATED ORAL
Qty: 90 TABLET | Refills: 3 | Status: SHIPPED | OUTPATIENT
Start: 2023-01-16 | End: 2024-02-08 | Stop reason: SDUPTHER

## 2023-01-16 NOTE — TELEPHONE ENCOUNTER
Received request via: Pharmacy    Was the patient seen in the last year in this department? Yes    Does the patient have an active prescription (recently filled or refills available) for medication(s) requested? No    Does the patient have alf Plus and need 100 day supply (blood pressure, diabetes and cholesterol meds only)? Patient does not have SCP

## 2023-05-17 ENCOUNTER — OFFICE VISIT (OUTPATIENT)
Dept: MEDICAL GROUP | Facility: PHYSICIAN GROUP | Age: 63
End: 2023-05-17
Payer: OTHER GOVERNMENT

## 2023-05-17 VITALS
TEMPERATURE: 98.9 F | OXYGEN SATURATION: 95 % | HEART RATE: 88 BPM | WEIGHT: 165 LBS | SYSTOLIC BLOOD PRESSURE: 128 MMHG | DIASTOLIC BLOOD PRESSURE: 72 MMHG | HEIGHT: 60 IN | RESPIRATION RATE: 16 BRPM | BODY MASS INDEX: 32.39 KG/M2

## 2023-05-17 DIAGNOSIS — F33.2 SEVERE EPISODE OF RECURRENT MAJOR DEPRESSIVE DISORDER, WITHOUT PSYCHOTIC FEATURES (HCC): ICD-10-CM

## 2023-05-17 DIAGNOSIS — R92.1 BREAST CALCIFICATIONS ON MAMMOGRAM: ICD-10-CM

## 2023-05-17 DIAGNOSIS — K76.89 HEPATIC CYST: ICD-10-CM

## 2023-05-17 DIAGNOSIS — E78.2 MIXED HYPERLIPIDEMIA: ICD-10-CM

## 2023-05-17 DIAGNOSIS — F41.9 ANXIETY: ICD-10-CM

## 2023-05-17 DIAGNOSIS — R87.629 ABNORMAL PAP SMEAR OF VAGINA: ICD-10-CM

## 2023-05-17 DIAGNOSIS — E66.9 OBESITY (BMI 30-39.9): ICD-10-CM

## 2023-05-17 PROBLEM — F43.21 GRIEVING: Status: RESOLVED | Noted: 2018-06-27 | Resolved: 2023-05-17

## 2023-05-17 PROCEDURE — 3078F DIAST BP <80 MM HG: CPT | Performed by: NURSE PRACTITIONER

## 2023-05-17 PROCEDURE — 3074F SYST BP LT 130 MM HG: CPT | Performed by: NURSE PRACTITIONER

## 2023-05-17 PROCEDURE — 99214 OFFICE O/P EST MOD 30 MIN: CPT | Performed by: NURSE PRACTITIONER

## 2023-05-17 RX ORDER — BUSPIRONE HYDROCHLORIDE 15 MG/1
15 TABLET ORAL NIGHTLY
Qty: 30 TABLET | Refills: 1 | Status: SHIPPED | OUTPATIENT
Start: 2023-05-17 | End: 2023-06-07

## 2023-05-17 ASSESSMENT — FIBROSIS 4 INDEX: FIB4 SCORE: 1.04

## 2023-05-17 ASSESSMENT — PATIENT HEALTH QUESTIONNAIRE - PHQ9: CLINICAL INTERPRETATION OF PHQ2 SCORE: 0

## 2023-05-18 ASSESSMENT — ENCOUNTER SYMPTOMS
NERVOUS/ANXIOUS: 1
HEADACHES: 0
WEIGHT LOSS: 0
ABDOMINAL PAIN: 0
FEVER: 0
DIZZINESS: 0
CHILLS: 0
DEPRESSION: 0
SHORTNESS OF BREATH: 0

## 2023-05-18 NOTE — PROGRESS NOTES
CC:  Chief Complaint   Patient presents with    Follow-Up       HISTORY OF PRESENT ILLNESS: Patient is a 62 y.o. female established patient presenting     Problem   Anxiety    Patient has recently been caring for her mother as her father passed in September and her mother's been doing with cancer.  Currently her mother has been in remission however they did move her from [place] to [place].  She recently quit her job to care for her mother full-time.  She notes that occasionally she gets anxious especially about her mom and her health conditions as well as possible deterioration is looking for medication to help her with this.         Abnormal Pap Smear of Vagina    Patient has a history of abnormal Pap smears in the past.  He does need to show normal Pap smear 2020 and cytology was atypical in 2021 was normal again.  She did have a hysterectomy at 38 years old due to HPV.  Is needing new testing       Mixed Hyperlipidemia    Continues with cresto 10 mg   Latest Reference Range & Units 11/14/22 12:25   Cholesterol,Tot 100 - 199 mg/dL 201 (H)   Triglycerides 0 - 149 mg/dL 115   HDL >=40 mg/dL 80   LDL <100 mg/dL 98   (H): Data is abnormally high     Hepatic Cyst    Imaging shows that there was a stable hepatic cyst from 2018, 2020, 2021 that has routinely been less than 4 cm.  At this time imaging shows that there is no need for surveillance however we will continue to monitor her symptoms as needed        Breast Calcifications On Mammogram    Mammogram form 11/2022  FINDINGS:     There are scattered areas of fibroglandular density.     There is no dominant mass, suspicious calcification, or any secondary malignant sign.     Biopsy clip identified in the left breast. There is postsurgical architectural distortion laterally in the right breast. Benign-appearing calcifications are once again noted.       Obesity (Bmi 30-39.9)    bmi 32.22  Wt 165  Noted weight loss since last visit       Episode of Recurrent Major  Depressive Disorder (Hcc)    Currently takes Prozac 20 mg daily and has noticed improvement with this.  She does state however that she is getting anxiety as patient has recently been caring for her mother as her father passed in September and her mother's been doing with cancer.  Currently her mother has been in remission however they did move her from [place] to [place].  She recently quit her job to care for her mother full-time.   Denies FABI CARLSON       Grieving (Resolved)    Lost  to dissecting aortic aneursym               Review of Systems   Constitutional:  Negative for chills, fever, malaise/fatigue and weight loss.   Respiratory:  Negative for shortness of breath.    Cardiovascular:  Negative for chest pain.   Gastrointestinal:  Negative for abdominal pain.   Neurological:  Negative for dizziness and headaches.   Psychiatric/Behavioral:  Negative for depression. The patient is nervous/anxious.              - NOTE: All other systems reviewed and are negative, except as in HPI.      Exam:    /72   Pulse 88   Temp 37.2 °C (98.9 °F) (Temporal)   Resp 16   Ht 1.524 m (5')   Wt 74.8 kg (165 lb)   SpO2 95%  Body mass index is 32.22 kg/m².    Physical Exam  Constitutional:       Appearance: Normal appearance. She is normal weight.   HENT:      Head: Normocephalic and atraumatic.      Right Ear: Tympanic membrane, ear canal and external ear normal.      Left Ear: Tympanic membrane, ear canal and external ear normal.      Nose: Nose normal.      Mouth/Throat:      Mouth: Mucous membranes are moist.      Pharynx: Oropharynx is clear.   Eyes:      Extraocular Movements: Extraocular movements intact.      Conjunctiva/sclera: Conjunctivae normal.      Pupils: Pupils are equal, round, and reactive to light.   Cardiovascular:      Rate and Rhythm: Normal rate and regular rhythm.      Pulses: Normal pulses.      Heart sounds: Normal heart sounds.   Pulmonary:      Effort: Pulmonary effort is normal.       Breath sounds: Normal breath sounds.   Musculoskeletal:         General: Normal range of motion.      Cervical back: Normal range of motion and neck supple. No tenderness.   Lymphadenopathy:      Cervical: No cervical adenopathy.   Skin:     General: Skin is warm and dry.      Capillary Refill: Capillary refill takes less than 2 seconds.   Neurological:      General: No focal deficit present.      Mental Status: She is alert and oriented to person, place, and time.   Psychiatric:         Mood and Affect: Mood normal.         Behavior: Behavior normal.         Thought Content: Thought content normal.         Judgment: Judgment normal.           Assessment/Plan:    1. Severe episode of recurrent major depressive disorder, without psychotic features (HCC)  Chronic and exacerbated condition  Continue with Prozac 20 mg however we will have patient start on her BuSpar half a tab at night and she can slowly progress up to a full tab.  She is concerned if she starts this medication she may not wake up for her mother that we will start a low-dose and trend her up as needed  - busPIRone (BUSPAR) 15 MG tablet; Take 1 Tablet by mouth every evening.  Dispense: 30 Tablet; Refill: 1    2. Abnormal Pap smear of vagina  Chronic and stable condition  Will get new testing    3. Hepatic cyst  Chronic and stable condition  We will continue to monitor as needed for symptoms    4. Mixed hyperlipidemia  Chronic and stable condition  Not due for labs until next November    5. Obesity (BMI 30-39.9)  Chronic and stable condition  It is recommended by the [place] Preventative Services Task Force (USPSTF) that adults 18 years and older participate in at least 150 minutes of moderate activity as well as 2 days of strength training weekly.       6. Breast calcifications on mammogram  Chronic and stable condition  Continue with annual mammograms    7. Anxiety  Chronic and exacerbated condition  Continue with Prozac 20 mg however we will have  patient start on her BuSpar half a tab at night and she can slowly progress up to a full tab.  She is concerned if she starts this medication she may not wake up for her mother that we will start a low-dose and trend her up as needed  - busPIRone (BUSPAR) 15 MG tablet; Take 1 Tablet by mouth every evening.  Dispense: 30 Tablet; Refill: 1      Discussed with patient possible alternative diagnoses, patient is to take all medications as prescribed.     If symptoms persist FU w/PCP, if symptoms worsen go to emergency room.     If experiencing any side effects from prescribed medications reports to the office immediately or go to emergency room.    Reviewed indication, dosage, usage and potential adverse effects of prescribed medications.     Reviewed risks and benefits of treatment plan. Patient verbalizes understanding of all instruction and verbally agrees to plan.      Return in about 6 months (around 11/17/2023) for Follow-up new labs.      Please note that this dictation was created using voice recognition software. I have made every reasonable attempt to correct obvious errors, but I expect that there are errors of grammar and possibly content that I did not discover before finalizing the note.

## 2023-06-07 ENCOUNTER — OFFICE VISIT (OUTPATIENT)
Dept: MEDICAL GROUP | Facility: PHYSICIAN GROUP | Age: 63
End: 2023-06-07
Payer: OTHER GOVERNMENT

## 2023-06-07 VITALS
WEIGHT: 167.8 LBS | DIASTOLIC BLOOD PRESSURE: 70 MMHG | SYSTOLIC BLOOD PRESSURE: 130 MMHG | BODY MASS INDEX: 32.94 KG/M2 | RESPIRATION RATE: 12 BRPM | OXYGEN SATURATION: 93 % | HEART RATE: 78 BPM | TEMPERATURE: 97.9 F | HEIGHT: 60 IN

## 2023-06-07 DIAGNOSIS — F41.9 ANXIETY: ICD-10-CM

## 2023-06-07 PROCEDURE — 3078F DIAST BP <80 MM HG: CPT | Performed by: NURSE PRACTITIONER

## 2023-06-07 PROCEDURE — 3075F SYST BP GE 130 - 139MM HG: CPT | Performed by: NURSE PRACTITIONER

## 2023-06-07 PROCEDURE — 99214 OFFICE O/P EST MOD 30 MIN: CPT | Performed by: NURSE PRACTITIONER

## 2023-06-07 RX ORDER — HYDROXYZINE 50 MG/1
50 TABLET, FILM COATED ORAL NIGHTLY PRN
Qty: 60 TABLET | Refills: 0 | Status: SHIPPED | OUTPATIENT
Start: 2023-06-07 | End: 2023-08-06

## 2023-06-07 ASSESSMENT — FIBROSIS 4 INDEX: FIB4 SCORE: 1.04

## 2023-06-07 ASSESSMENT — ENCOUNTER SYMPTOMS
HEADACHES: 0
CHILLS: 0
WEIGHT LOSS: 0
FEVER: 0
DIZZINESS: 0
NERVOUS/ANXIOUS: 1
SHORTNESS OF BREATH: 0
DEPRESSION: 0
INSOMNIA: 1

## 2023-06-07 ASSESSMENT — LIFESTYLE VARIABLES: SUBSTANCE_ABUSE: 0

## 2023-06-07 NOTE — PROGRESS NOTES
CC:  Chief Complaint   Patient presents with    Medication Follow-up     busPIRone       HISTORY OF PRESENT ILLNESS: Patient is a 62 y.o. female established patient presenting     Problem   Anxiety    At our last appt patient was started on Buspar.  States she had bad SE and started to have nightmares as well as feeling groogy when she wakes up   States she went back to atarax and unisom and states she is sleeping better and less anxiety       5/18/2023  Patient has recently been caring for her mother as her father passed in September and her mother's been doing with cancer.  Currently her mother has been in remission however they did move her from [place] to [place].  She recently quit her job to care for her mother full-time.  She notes that occasionally she gets anxious especially about her mom and her health conditions as well as possible deterioration is looking for medication to help her with this.             Review of Systems   Constitutional:  Negative for chills, fever and weight loss.   Respiratory:  Negative for shortness of breath.    Cardiovascular:  Negative for chest pain.   Neurological:  Negative for dizziness and headaches.   Psychiatric/Behavioral:  Negative for depression and substance abuse. The patient is nervous/anxious and has insomnia.              - NOTE: All other systems reviewed and are negative, except as in HPI.      Exam:    /70 (BP Location: Right arm, Patient Position: Sitting, BP Cuff Size: Adult)   Pulse 78   Temp 36.6 °C (97.9 °F) (Temporal)   Resp 12   Ht 1.524 m (5')   Wt 76.1 kg (167 lb 12.8 oz)   SpO2 93%  Body mass index is 32.77 kg/m².    Physical Exam  Constitutional:       Appearance: Normal appearance. She is normal weight.   HENT:      Head: Normocephalic and atraumatic.   Pulmonary:      Effort: Pulmonary effort is normal.   Neurological:      Mental Status: She is alert and oriented to person, place, and time.   Psychiatric:         Mood and Affect: Mood  normal.         Behavior: Behavior normal.         Thought Content: Thought content normal.         Judgment: Judgment normal.           Assessment/Plan:    1. Anxiety  Chronic and stable condition   - hydrOXYzine HCl (ATARAX) 50 MG Tab; Take 1 Tablet by mouth at bedtime as needed for Anxiety (sleeping) for up to 60 days.  Dispense: 60 Tablet; Refill: 0       Discussed with patient possible alternative diagnoses, patient is to take all medications as prescribed.     If symptoms persist FU w/PCP, if symptoms worsen go to emergency room.     If experiencing any side effects from prescribed medications reports to the office immediately or go to emergency room.    Reviewed indication, dosage, usage and potential adverse effects of prescribed medications.     Reviewed risks and benefits of treatment plan. Patient verbalizes understanding of all instruction and verbally agrees to plan.      Return in about 4 weeks (around 7/5/2023) for follow up atarax sleeping meds.      Please note that this dictation was created using voice recognition software. I have made every reasonable attempt to correct obvious errors, but I expect that there are errors of grammar and possibly content that I did not discover before finalizing the note.

## 2023-06-14 ENCOUNTER — HOSPITAL ENCOUNTER (OUTPATIENT)
Facility: MEDICAL CENTER | Age: 63
End: 2023-06-14
Attending: NURSE PRACTITIONER
Payer: OTHER GOVERNMENT

## 2023-06-14 ENCOUNTER — OFFICE VISIT (OUTPATIENT)
Dept: MEDICAL GROUP | Facility: PHYSICIAN GROUP | Age: 63
End: 2023-06-14
Payer: OTHER GOVERNMENT

## 2023-06-14 VITALS
SYSTOLIC BLOOD PRESSURE: 118 MMHG | BODY MASS INDEX: 32.79 KG/M2 | TEMPERATURE: 97.5 F | HEIGHT: 60 IN | OXYGEN SATURATION: 95 % | WEIGHT: 167 LBS | DIASTOLIC BLOOD PRESSURE: 78 MMHG | RESPIRATION RATE: 12 BRPM | HEART RATE: 94 BPM

## 2023-06-14 DIAGNOSIS — Z12.4 PAPANICOLAOU SMEAR FOR CERVICAL CANCER SCREENING: ICD-10-CM

## 2023-06-14 DIAGNOSIS — R87.629 ABNORMAL PAP SMEAR OF VAGINA: ICD-10-CM

## 2023-06-14 PROCEDURE — 99396 PREV VISIT EST AGE 40-64: CPT | Performed by: NURSE PRACTITIONER

## 2023-06-14 PROCEDURE — 87624 HPV HI-RISK TYP POOLED RSLT: CPT

## 2023-06-14 PROCEDURE — 3078F DIAST BP <80 MM HG: CPT | Performed by: NURSE PRACTITIONER

## 2023-06-14 PROCEDURE — 99000 SPECIMEN HANDLING OFFICE-LAB: CPT | Performed by: NURSE PRACTITIONER

## 2023-06-14 PROCEDURE — 88175 CYTOPATH C/V AUTO FLUID REDO: CPT

## 2023-06-14 PROCEDURE — 3074F SYST BP LT 130 MM HG: CPT | Performed by: NURSE PRACTITIONER

## 2023-06-14 ASSESSMENT — FIBROSIS 4 INDEX: FIB4 SCORE: 1.04

## 2023-06-25 DIAGNOSIS — F33.2 SEVERE EPISODE OF RECURRENT MAJOR DEPRESSIVE DISORDER, WITHOUT PSYCHOTIC FEATURES (HCC): ICD-10-CM

## 2023-06-26 NOTE — TELEPHONE ENCOUNTER
Received request via: Pharmacy    Was the patient seen in the last year in this department? Yes    Does the patient have an active prescription (recently filled or refills available) for medication(s) requested? No    Does the patient have snf Plus and need 100 day supply (blood pressure, diabetes and cholesterol meds only)? Medication is not for cholesterol, blood pressure or diabetes and Patient does not have SCP

## 2023-06-27 RX ORDER — FLUOXETINE HYDROCHLORIDE 20 MG/1
20 CAPSULE ORAL
Qty: 90 CAPSULE | Refills: 3 | Status: SHIPPED | OUTPATIENT
Start: 2023-06-27 | End: 2023-12-27

## 2023-10-04 ENCOUNTER — TELEPHONE (OUTPATIENT)
Dept: MEDICAL GROUP | Facility: PHYSICIAN GROUP | Age: 63
End: 2023-10-04
Payer: MEDICARE

## 2023-10-04 NOTE — TELEPHONE ENCOUNTER
Pt lvm stating testing positive for covid and wanting medication for it. I have called pt back to schedule appt for medication however pt did not answer, LVM for pt to call us back.

## 2023-12-27 DIAGNOSIS — F33.2 SEVERE EPISODE OF RECURRENT MAJOR DEPRESSIVE DISORDER, WITHOUT PSYCHOTIC FEATURES (HCC): ICD-10-CM

## 2023-12-27 RX ORDER — FLUOXETINE HYDROCHLORIDE 20 MG/1
20 CAPSULE ORAL
Qty: 90 CAPSULE | Refills: 3 | Status: SHIPPED | OUTPATIENT
Start: 2023-12-27 | End: 2024-02-08 | Stop reason: SDUPTHER

## 2024-02-08 ENCOUNTER — OFFICE VISIT (OUTPATIENT)
Dept: MEDICAL GROUP | Facility: PHYSICIAN GROUP | Age: 64
End: 2024-02-08
Payer: MEDICARE

## 2024-02-08 VITALS
SYSTOLIC BLOOD PRESSURE: 128 MMHG | RESPIRATION RATE: 16 BRPM | OXYGEN SATURATION: 93 % | HEART RATE: 72 BPM | TEMPERATURE: 98.1 F | BODY MASS INDEX: 31.57 KG/M2 | HEIGHT: 60 IN | WEIGHT: 160.8 LBS | DIASTOLIC BLOOD PRESSURE: 76 MMHG

## 2024-02-08 DIAGNOSIS — Z78.0 POSTMENOPAUSAL STATUS (AGE-RELATED) (NATURAL): ICD-10-CM

## 2024-02-08 DIAGNOSIS — Z12.31 ENCOUNTER FOR SCREENING MAMMOGRAM FOR MALIGNANT NEOPLASM OF BREAST: ICD-10-CM

## 2024-02-08 DIAGNOSIS — F41.9 ANXIETY: ICD-10-CM

## 2024-02-08 DIAGNOSIS — J32.0 CHRONIC MAXILLARY SINUSITIS: ICD-10-CM

## 2024-02-08 DIAGNOSIS — E55.9 VITAMIN D DEFICIENCY: ICD-10-CM

## 2024-02-08 DIAGNOSIS — E66.9 OBESITY (BMI 30-39.9): ICD-10-CM

## 2024-02-08 DIAGNOSIS — F33.2 SEVERE EPISODE OF RECURRENT MAJOR DEPRESSIVE DISORDER, WITHOUT PSYCHOTIC FEATURES (HCC): ICD-10-CM

## 2024-02-08 DIAGNOSIS — N95.1 MENOPAUSAL STATE: ICD-10-CM

## 2024-02-08 DIAGNOSIS — K76.89 HEPATIC CYST: ICD-10-CM

## 2024-02-08 DIAGNOSIS — E78.2 MIXED HYPERLIPIDEMIA: ICD-10-CM

## 2024-02-08 DIAGNOSIS — R92.1 BREAST CALCIFICATIONS ON MAMMOGRAM: ICD-10-CM

## 2024-02-08 DIAGNOSIS — D22.9 MELANOCYTIC NEVUS OF SKIN: ICD-10-CM

## 2024-02-08 DIAGNOSIS — Z00.00 MEDICARE ANNUAL WELLNESS VISIT, INITIAL: Primary | ICD-10-CM

## 2024-02-08 DIAGNOSIS — R87.629 ABNORMAL PAP SMEAR OF VAGINA: ICD-10-CM

## 2024-02-08 DIAGNOSIS — G47.33 OSA ON CPAP: ICD-10-CM

## 2024-02-08 PROBLEM — K63.5 POLYP OF COLON: Status: ACTIVE | Noted: 2022-10-31

## 2024-02-08 PROCEDURE — G0402 INITIAL PREVENTIVE EXAM: HCPCS | Performed by: NURSE PRACTITIONER

## 2024-02-08 PROCEDURE — 3078F DIAST BP <80 MM HG: CPT | Performed by: NURSE PRACTITIONER

## 2024-02-08 PROCEDURE — 3074F SYST BP LT 130 MM HG: CPT | Performed by: NURSE PRACTITIONER

## 2024-02-08 RX ORDER — VITAMIN B COMPLEX
1000 TABLET ORAL DAILY
COMMUNITY

## 2024-02-08 RX ORDER — ROSUVASTATIN CALCIUM 10 MG/1
10 TABLET, COATED ORAL EVERY EVENING
Qty: 90 TABLET | Refills: 3 | Status: SHIPPED | OUTPATIENT
Start: 2024-02-08

## 2024-02-08 RX ORDER — FLUTICASONE PROPIONATE 50 MCG
SPRAY, SUSPENSION (ML) NASAL
Qty: 16 G | Refills: 6 | Status: SHIPPED | OUTPATIENT
Start: 2024-02-08

## 2024-02-08 RX ORDER — FLUOXETINE HYDROCHLORIDE 20 MG/1
20 CAPSULE ORAL
Qty: 90 CAPSULE | Refills: 3 | Status: SHIPPED | OUTPATIENT
Start: 2024-02-08

## 2024-02-08 RX ORDER — MULTIVIT WITH MINERALS/LUTEIN
TABLET ORAL
COMMUNITY

## 2024-02-08 ASSESSMENT — PATIENT HEALTH QUESTIONNAIRE - PHQ9: CLINICAL INTERPRETATION OF PHQ2 SCORE: 0

## 2024-02-08 ASSESSMENT — ACTIVITIES OF DAILY LIVING (ADL): BATHING_REQUIRES_ASSISTANCE: 0

## 2024-02-08 ASSESSMENT — FIBROSIS 4 INDEX: FIB4 SCORE: 1.05

## 2024-02-08 ASSESSMENT — ENCOUNTER SYMPTOMS: GENERAL WELL-BEING: GOOD

## 2024-02-08 NOTE — PROGRESS NOTES
Chief Complaint   Patient presents with    Annual Wellness Visit       HPI:  Rima Pope is a 63 y.o. here for Medicare Annual Wellness Visit     Patient Active Problem List    Diagnosis Date Noted    Melanocytic nevus of skin 02/08/2024    Anxiety 05/17/2023    Polyp of colon 10/31/2022    Abnormal Pap smear of vagina 06/01/2020    Mixed hyperlipidemia 08/23/2017    Hepatic cyst 08/23/2017    Breast calcifications on mammogram 08/23/2017    Obesity (BMI 30-39.9) 08/23/2017    MAXX on CPAP 08/23/2017    Episode of recurrent major depressive disorder (HCC) 08/23/2017       Current Outpatient Medications   Medication Sig Dispense Refill    vitamin D3 (CHOLECALCIFEROL) 1000 Unit (25 mcg) Tab Take 1,000 Units by mouth every day.      Ascorbic Acid (VITAMIN C) 1000 MG Tab Take  by mouth.      rosuvastatin (CRESTOR) 10 MG Tab Take 1 Tablet by mouth every evening. 90 Tablet 3    FLUoxetine (PROZAC) 20 MG Cap Take 1 Capsule by mouth every day. 90 Capsule 3    fluticasone (FLONASE) 50 MCG/ACT nasal spray SHAKE LIQUID AND USE 1 SPRAY IN EACH NOSTRIL EVERY DAY 16 g 6    Multiple Vitamin (MULTIVITAMIN ADULT PO) Take  by mouth.       No current facility-administered medications for this visit.          Current supplements as per medication list.     Allergies: Sulfa drugs    Current social contact/activities:   Recently visit to [place]   Caring for her new granddaughter and helping her daughter  Caregiver for mom       She  reports that she has never smoked. She has never used smokeless tobacco. She reports current alcohol use of about 1.2 oz of alcohol per week. She reports that she does not use drugs.  Counseling given: Not Answered      ROS:    Gait: Uses no assistive device  Ostomy: No  Other tubes: No  Amputations: No  Chronic oxygen use: No  Last eye exam: years ago   Wears hearing aids: No   : Denies any urinary leakage during the last 6 months    Screening:    Depression Screening  Little interest or pleasure in doing  things?  0 - not at all  Feeling down, depressed , or hopeless? 0 - not at all  Patient Health Questionnaire Score: 0     If depressive symptoms identified deferred to follow up visit unless specifically addressed in assessment and plan.    Interpretation of PHQ-9 Total Score   Score Severity   1-4 No Depression   5-9 Mild Depression   10-14 Moderate Depression   15-19 Moderately Severe Depression   20-27 Severe Depression    Screening for Cognitive Impairment  Do you or any of your friends or family members have any concern about your memory? No  Three Minute Recall (Banana, Sunrise, Chair) 2/3    Lorne clock face with all 12 numbers and set the hands to show 20 past 8.  Yes    Cognitive concerns identified deferred for follow up unless specifically addressed in assessment and plan.    Fall Risk Assessment  Has the patient had two or more falls in the last year or any fall with injury in the last year?  No    Safety Assessment  Do you always wear your seatbelt?  Yes  Any changes to home needed to function safely? No  Difficulty hearing.  No  Patient counseled about all safety risks that were identified.    Functional Assessment ADLs  Are there any barriers preventing you from cooking for yourself or meeting nutritional needs?  No.    Are there any barriers preventing you from driving safely or obtaining transportation?  No.    Are there any barriers preventing you from using a telephone or calling for help?  No    Are there any barriers preventing you from shopping?  No.    Are there any barriers preventing you from taking care of your own finances?  No    Are there any barriers preventing you from managing your medications?  No    Are there any barriers preventing you from showering, bathing or dressing yourself? No    Are there any barriers preventing you from doing housework or laundry? No  Are there any barriers preventing you from using the toilet?No  Are you currently engaging in any exercise or physical  activity?  Yes. Walk once a week     Self-Assessment of Health  What is your perception of your health? Good  Do you sleep more than six hours a night? Yes  In the past 7 days, how much did pain keep you from doing your normal work? None  Do you spend quality time with family or friends (virtually or in person)? Yes  Do you usually eat a heart healthy diet that constists of a variety of fruits, vegetables, whole grains and fiber? Yes  Do you eat foods high in fat and/or Fast Food more than three times per week? No    Advance Care Planning  Do you have an Advance Directive, Living Will, Durable Power of , or POLST? No                 Health Maintenance Summary            Ordered - Bone Density Scan (Every 5 Years) Ordered on 2/8/2024      No completion history exists for this topic.              Overdue - Pneumococcal Vaccine: 65+ Years (1 - PCV) Overdue - never done      No completion history exists for this topic.              Postponed - Hepatitis C Screening (Once) Postponed until 2/9/2024      No completion history exists for this topic.              Postponed - HIV Screening (Once) Postponed until 2/8/2025      No completion history exists for this topic.              Postponed - COVID-19 Vaccine (5 - 2023-24 season) Postponed until 2/8/2025      08/15/2022  Imm Admin: MODERNA SARS-COV-2 VACCINE (12+)    01/08/2022  Imm Admin: MODERNA SARS-COV-2 VACCINE (12+)    04/22/2021  Imm Admin: MODERNA SARS-COV-2 VACCINE (12+)    03/24/2021  Imm Admin: MODERNA SARS-COV-2 VACCINE (12+)              Ordered - Mammogram (Every 2 Years) Ordered on 2/8/2024 11/07/2022  MA-SCREENING MAMMO BILAT W/TOMOSYNTHESIS W/CAD    09/20/2021  MA-SCREENING MAMMO BILAT W/TOMOSYNTHESIS W/CAD    08/18/2020  MA-SCREENING MAMMO BILAT W/TOMOSYNTHESIS W/CAD    08/06/2019  MA-SCREENING MAMMO BILAT W/TOMOSYNTHESIS W/CAD    07/24/2018  MA-DIAGNOSTIC DIGITAL MAMMO-UNILAT LEFT    Only the first 5 history entries have been loaded, but  more history exists.              Annual Wellness Visit (Yearly) Next due on 2/8/2025 02/08/2024  Level of Service: DC INITIAL ANNUAL WELLNESS VISIT-INCLUDES PPPS    02/08/2024  Visit Dx: Medicare annual wellness visit, initial              IMM DTaP/Tdap/Td Vaccine (3 - Td or Tdap) Next due on 4/4/2032 04/04/2022  Imm Admin: Tdap Vaccine    02/23/2016  Imm Admin: Tdap Vaccine              Colorectal Cancer Screening (Colonoscopy - Every 10 Years) Tentatively due on 10/31/2032      10/31/2022  AMB EXTERNAL COLONOSCOPY RESULTS    11/15/2017  OCCULT BLOOD FECES IMMUNOASSAY              Zoster (Shingles) Vaccines (Series Information) Completed      01/29/2022  Imm Admin: Zoster Vaccine Recombinant (RZV) (SHINGRIX)    08/31/2021  Imm Admin: Zoster Vaccine Recombinant (RZV) (SHINGRIX)    04/01/2019  Imm Admin: Zoster Vaccine Recombinant (RZV) (SHINGRIX)              Influenza Vaccine (Series Information) Completed      10/10/2023  Imm Admin: Influenza Vaccine Quad Inj (Pf)    09/09/2022  Imm Admin: Influenza Seasonal Injectable - Historical Data    08/15/2022  Imm Admin: Influenza Vac Subunit Quad Inj (Pf)    11/22/2021  Imm Admin: Influenza Vaccine Quad Inj (Pf)    09/03/2020  Imm Admin: Influenza Vac Subunit Quad Inj (Pf)    Only the first 5 history entries have been loaded, but more history exists.              Hepatitis A Vaccine (Hep A) (Series Information) Aged Out      No completion history exists for this topic.              Hepatitis B Vaccine (Hep B) (Series Information) Aged Out      No completion history exists for this topic.              HPV Vaccines (Series Information) Aged Out      No completion history exists for this topic.              Polio Vaccine (Inactivated Polio) (Series Information) Aged Out      No completion history exists for this topic.              Meningococcal Immunization (Series Information) Aged Out      No completion history exists for this topic.              Discontinued -  Cervical Cancer Screening  Discontinued        Frequency changed to Never automatically (Topic No Longer Applies)    06/14/2023  THINPREP PAP WITH HPV    06/14/2023  Pathology Gynecology Specimen    08/31/2021  THINPREP PAP WITH HPV    08/31/2021  Pathology Gynecology Specimen    Only the first 5 history entries have been loaded, but more history exists.                    Patient Care Team:  MEDHAT Alcantar as PCP - General (Nurse Practitioner Primary Care)  [facility]        Social History     Tobacco Use    Smoking status: Never    Smokeless tobacco: Never   Vaping Use    Vaping Use: Never used   Substance Use Topics    Alcohol use: Yes     Alcohol/week: 1.2 oz     Types: 2 Glasses of wine per week     Comment: socially     Drug use: No     Family History   Problem Relation Age of Onset    Cancer Mother         lung, bladder, melanoma    Hyperlipidemia Mother     Alcohol/Drug Father     Alcohol/Drug Sister     Alcohol/Drug Brother     Alcohol/Drug Brother     No Known Problems Son     No Known Problems Daughter      She  has a past medical history of Abnormal mammogram of left breast (7/23/2018), Allergic rhinitis, Anxiety, Hyperlipidemia, and Nasal drainage.   Past Surgical History:   Procedure Laterality Date    ABDOMINAL HYSTERECTOMY TOTAL      HERNIA REPAIR      HYSTERECTOMY LAPAROSCOPY      PRIMARY C SECTION         Exam:   /76 (BP Location: Right arm, Patient Position: Sitting, BP Cuff Size: Adult)   Pulse 72   Temp 36.7 °C (98.1 °F) (Temporal)   Resp 16   Ht 1.524 m (5')   Wt 72.9 kg (160 lb 12.8 oz)   SpO2 93%  Body mass index is 31.4 kg/m².    Hearing excellent.    Dentition good  Alert, oriented in no acute distress.  Eye contact is good, speech goal directed, affect calm    Assessment and Plan. The following treatment and monitoring plan is recommended:    Problem   Melanocytic Nevus of Skin    Chronic and stable condition   Needs new referral to derm      Anxiety     Chronic and stable condition   Has been doing well on the prozac 20 mg  Is doing better with the grief of her husbands death       Polyp of Colon    Chronic and stable condition   Lat colonoscopy was 2022  Will look to see when she is to return for recall      Abnormal Pap Smear of Vagina    Chronic and stable condition   Would like retesting this year   He does need to show normal Pap smear 2020 and cytology was atypical in 2021 was normal again.    She did have a hysterectomy at 38 years old due to HPV.  Is needing new testing  Denies any vaginal discharge, lower pelvic pain, vaginal bleeding, dyspareunia      Mixed Hyperlipidemia    Chronic and stable condition   Continues with cresto 10 mg  Needs new labs       Hepatic Cyst    Chronic and stable condition   Imaging shows that there was a stable hepatic cyst from 2018, 2020, 2021 that has routinely been less than 4 cm.  At this time imaging shows that there is no need for surveillance however we will continue to monitor her symptoms as needed   Would like new imaging     Breast Calcifications On Mammogram    Chronic and stable condition   Needs new mammogram  Mammogram form 11/2022  FINDINGS:  There are scattered areas of fibroglandular density.  There is no dominant mass, suspicious calcification, or any secondary malignant sign.  Biopsy clip identified in the left breast. There is postsurgical architectural distortion laterally in the right breast. Benign-appearing calcifications are once again noted.     Obesity (Bmi 30-39.9)    BMI 31.40  Wt 160lb  Notes she has lost 5-7lbs since last visit     Bhanu On Cpap    Chronic and stable condition   Ha CPAP that she still does not use  Denies fatigue, waking up in middle of night, day time fatigue  Has no interest in getting back on them      Episode of Recurrent Major Depressive Disorder (Hcc)    Chronic and stable condition   Has been doing well on the prozac 20 mg  Is doing better with the grief of her husbands  death   Currently her mother has been in remission and is a caregiver to her mother.     Denies SI HI          Services suggested: No services needed at this time  Health Care Screening: Age-appropriate preventive services recommended by USPTF and ACIP covered by Medicare were discussed today. Services ordered if indicated and agreed upon by the patient.  Referrals offered: Community-based lifestyle interventions to reduce health risks and promote self-management and wellness, fall prevention, nutrition, physical activity, tobacco-use cessation, weight loss, and mental health services as per orders if indicated.    Discussion today about general wellness and lifestyle habits:    Prevent falls and reduce trip hazards; Cautioned about securing or removing rugs.  Have a working fire alarm and carbon monoxide detector;   Engage in regular physical activity and social activities     Follow-up: Return for pending labs.

## 2024-03-18 ENCOUNTER — HOSPITAL ENCOUNTER (OUTPATIENT)
Dept: RADIOLOGY | Facility: MEDICAL CENTER | Age: 64
End: 2024-03-18
Attending: NURSE PRACTITIONER
Payer: MEDICARE

## 2024-03-18 DIAGNOSIS — Z12.31 ENCOUNTER FOR SCREENING MAMMOGRAM FOR MALIGNANT NEOPLASM OF BREAST: ICD-10-CM

## 2024-03-18 PROCEDURE — 77067 SCR MAMMO BI INCL CAD: CPT

## 2024-12-03 ENCOUNTER — APPOINTMENT (OUTPATIENT)
Dept: MEDICAL GROUP | Facility: PHYSICIAN GROUP | Age: 64
End: 2024-12-03
Payer: MEDICARE

## 2024-12-03 ENCOUNTER — HOSPITAL ENCOUNTER (OUTPATIENT)
Facility: MEDICAL CENTER | Age: 64
End: 2024-12-03
Attending: NURSE PRACTITIONER
Payer: MEDICARE

## 2024-12-03 VITALS
OXYGEN SATURATION: 96 % | HEIGHT: 60 IN | RESPIRATION RATE: 12 BRPM | HEART RATE: 78 BPM | WEIGHT: 159 LBS | SYSTOLIC BLOOD PRESSURE: 120 MMHG | TEMPERATURE: 98.6 F | DIASTOLIC BLOOD PRESSURE: 88 MMHG | BODY MASS INDEX: 31.22 KG/M2

## 2024-12-03 DIAGNOSIS — J32.0 CHRONIC MAXILLARY SINUSITIS: ICD-10-CM

## 2024-12-03 DIAGNOSIS — M81.6 LOCALIZED OSTEOPOROSIS WITHOUT CURRENT PATHOLOGICAL FRACTURE: ICD-10-CM

## 2024-12-03 DIAGNOSIS — Z12.4 ENCOUNTER FOR PAPANICOLAOU SMEAR FOR CERVICAL CANCER SCREENING: ICD-10-CM

## 2024-12-03 DIAGNOSIS — R87.629 ABNORMAL PAP SMEAR OF VAGINA: ICD-10-CM

## 2024-12-03 DIAGNOSIS — F33.2 SEVERE EPISODE OF RECURRENT MAJOR DEPRESSIVE DISORDER, WITHOUT PSYCHOTIC FEATURES (HCC): ICD-10-CM

## 2024-12-03 DIAGNOSIS — E78.2 MIXED HYPERLIPIDEMIA: ICD-10-CM

## 2024-12-03 PROBLEM — M81.0 OSTEOPOROSIS: Status: ACTIVE | Noted: 2024-12-03

## 2024-12-03 PROCEDURE — 3079F DIAST BP 80-89 MM HG: CPT | Performed by: NURSE PRACTITIONER

## 2024-12-03 PROCEDURE — 99000 SPECIMEN HANDLING OFFICE-LAB: CPT | Performed by: NURSE PRACTITIONER

## 2024-12-03 PROCEDURE — 87624 HPV HI-RISK TYP POOLED RSLT: CPT

## 2024-12-03 PROCEDURE — 3074F SYST BP LT 130 MM HG: CPT | Performed by: NURSE PRACTITIONER

## 2024-12-03 PROCEDURE — 99213 OFFICE O/P EST LOW 20 MIN: CPT | Performed by: NURSE PRACTITIONER

## 2024-12-03 PROCEDURE — 88142 CYTOPATH C/V THIN LAYER: CPT

## 2024-12-03 RX ORDER — ROSUVASTATIN CALCIUM 10 MG/1
10 TABLET, COATED ORAL EVERY EVENING
Qty: 90 TABLET | Refills: 3 | Status: SHIPPED | OUTPATIENT
Start: 2024-12-03

## 2024-12-03 RX ORDER — FLUTICASONE PROPIONATE 50 MCG
SPRAY, SUSPENSION (ML) NASAL
Qty: 16 G | Refills: 6 | Status: SHIPPED | OUTPATIENT
Start: 2024-12-03

## 2024-12-03 NOTE — PROGRESS NOTES
Subjective:   S:  Rima Pope is a 64 y.o.,female who presents today for her annual Pap and GYN exam.  She is feeling well and denies any complaints.    A chaperone was offered to the patient during today's exam. Patient declined chaperone.    Ob-Gyn/ History:    Patient has GYN provider: no  /Para:  2/2  Last Pap Smear:  2024.   Yes history of abnormal pap smears.  Treatment for abnormal Pap smear: HPV  Gyn Surgery:  hysterectomy.  Current Contraceptive Method:  hysterectomy..   IS currently sexually active.  Last menstrual period:  none- menopuase/hysterectomy.    No significant bloating/fluid retention, pelvic pain, or dyspareunia.   No vaginal discharge  Post-menopausal bleeding: none  Urinary incontinence: none  Folate intake: not indicated       Cancer screening  Colorectal Cancer Screening: due     Lung Cancer Screening: NA    Cervical Cancer Screening: new orders placed    Breast Cancer Screening: completed 2024     Infectious disease screening/Immunizations  --STI Screening: not interested   --Practices safe sex.  --HIV Screening: not interested   --Hepatitis C Screening: not interested     Her preventative health screens are up to date.        Patient Active Problem List    Diagnosis Date Noted   • Osteoporosis 2024   • Melanocytic nevus of skin 2024   • Anxiety 2023   • Polyp of colon 10/31/2022   • Abnormal Pap smear of vagina 2020   • Mixed hyperlipidemia 2017   • Hepatic cyst 2017   • Breast calcifications on mammogram 2017   • Obesity (BMI 30-39.9) 2017   • MAXX on CPAP 2017   • Episode of recurrent major depressive disorder (HCC) 2017       Current Outpatient Medications on File Prior to Visit   Medication Sig Dispense Refill   • vitamin D3 (CHOLECALCIFEROL) 1000 Unit (25 mcg) Tab Take 1,000 Units by mouth every day.     • Ascorbic Acid (VITAMIN C) 1000 MG Tab Take  by mouth.     • rosuvastatin (CRESTOR) 10 MG Tab  Take 1 Tablet by mouth every evening. 90 Tablet 3   • FLUoxetine (PROZAC) 20 MG Cap Take 1 Capsule by mouth every day. 90 Capsule 3   • fluticasone (FLONASE) 50 MCG/ACT nasal spray SHAKE LIQUID AND USE 1 SPRAY IN EACH NOSTRIL EVERY DAY 16 g 6   • Multiple Vitamin (MULTIVITAMIN ADULT PO) Take  by mouth.       No current facility-administered medications on file prior to visit.       Social History     Tobacco Use   • Smoking status: Never   • Smokeless tobacco: Never   Substance Use Topics   • Alcohol use: Yes     Alcohol/week: 1.2 oz     Types: 2 Glasses of wine per week     Comment: socially        She  has a past medical history of Abnormal mammogram of left breast (7/23/2018), Allergic rhinitis, Anxiety, Hyperlipidemia, and Nasal drainage.    She  has a past surgical history that includes primary c section; abdominal hysterectomy total; hernia repair; and hysterectomy laparoscopy.    Family History   Problem Relation Age of Onset   • Cancer Mother         lung, bladder, melanoma   • Hyperlipidemia Mother    • Alcohol/Drug Father    • Alcohol/Drug Sister    • Alcohol/Drug Brother    • Alcohol/Drug Brother    • No Known Problems Son    • No Known Problems Daughter        Social History     Socioeconomic History   • Marital status:      Spouse name: Not on file   • Number of children: Not on file   • Years of education: Not on file   • Highest education level: Not on file   Occupational History   • Not on file   Tobacco Use   • Smoking status: Never   • Smokeless tobacco: Never   Vaping Use   • Vaping status: Never Used   Substance and Sexual Activity   • Alcohol use: Yes     Alcohol/week: 1.2 oz     Types: 2 Glasses of wine per week     Comment: socially    • Drug use: No   • Sexual activity: Yes     Partners: Male   Other Topics Concern   • Not on file   Social History Narrative   • Not on file     Social Drivers of Health     Financial Resource Strain: Not on file   Food Insecurity: Not on file    Transportation Needs: Not on file   Physical Activity: Not on file   Stress: Not on file   Social Connections: Not on file   Intimate Partner Violence: Not on file   Housing Stability: Not on file       Current Outpatient Medications   Medication Sig Dispense Refill   • vitamin D3 (CHOLECALCIFEROL) 1000 Unit (25 mcg) Tab Take 1,000 Units by mouth every day.     • Ascorbic Acid (VITAMIN C) 1000 MG Tab Take  by mouth.     • rosuvastatin (CRESTOR) 10 MG Tab Take 1 Tablet by mouth every evening. 90 Tablet 3   • FLUoxetine (PROZAC) 20 MG Cap Take 1 Capsule by mouth every day. 90 Capsule 3   • fluticasone (FLONASE) 50 MCG/ACT nasal spray SHAKE LIQUID AND USE 1 SPRAY IN EACH NOSTRIL EVERY DAY 16 g 6   • Multiple Vitamin (MULTIVITAMIN ADULT PO) Take  by mouth.       No current facility-administered medications for this visit.       Allergies   Allergen Reactions   • Sulfa Drugs Hives and Unspecified     Reaction(s): Unknown; Note: [id number]       GYN ROS:  no breast pain or new or enlarging lumps on self exam, no vaginal bleeding, no discharge or pelvic pain, no hot flashes      Objective:     /88   Pulse 78   Temp 37 °C (98.6 °F) (Temporal)   Resp 12   Ht 1.524 m (5')   Wt 72.1 kg (159 lb)   LMP 09/24/1999   SpO2 96%   BMI 31.05 kg/m²   Body mass index is 31.05 kg/m².  Wt Readings from Last 4 Encounters:   12/03/24 72.1 kg (159 lb)   02/08/24 72.9 kg (160 lb 12.8 oz)   06/14/23 75.8 kg (167 lb)   06/07/23 76.1 kg (167 lb 12.8 oz)       Physical Exam   Breasts:  Symmetrical, nontender, without masses or nipple discharge.   External Genitalia: general appearance normal, hair distrubution, no lesions noted  Vulva: grossly unremarkable, no lesions or masses noted  Vagina: no abnormal discharge, normal color  Cervix: surgically removed  Uterus: Surgically Absent   Bladder: empty  Bimanual exam: surgically absent  Rectal: not performed        Assessment and Plan:   Assessment:  NormalGYN Exam      1. Localized  osteoporosis without current pathological fracture  Chronic and stable condition     2. Abnormal Pap smear of vagina  - THINPREP PAP WITH HPV; Future    3. Encounter for Papanicolaou smear for cervical cancer screening  - THINPREP PAP WITH HPV; Future     Plan:   mammogram  pap smear  return annually or prn  Teaching completed for perineal hygiene and prevention of infection.  Wash hands before and after genital contact and after using restroom, wipe front to back.  Avoid products that can disturb normal vaginal sylvester such as douching.   Do not use feminine hygiene sprays, deodorants, gels, powders or scented tampons or pads.    Pap processed and sent to the lab.      Follow-up: Return for pending results.

## 2024-12-04 DIAGNOSIS — Z12.4 ENCOUNTER FOR PAPANICOLAOU SMEAR FOR CERVICAL CANCER SCREENING: ICD-10-CM

## 2024-12-04 DIAGNOSIS — R87.629 ABNORMAL PAP SMEAR OF VAGINA: ICD-10-CM

## 2024-12-09 LAB
HPV I/H RISK 1 DNA SPEC QL NAA+PROBE: NOT DETECTED
SPECIMEN SOURCE: NORMAL
THINPREP PAP, CYTOLOGY NL11781: NORMAL

## 2025-02-01 DIAGNOSIS — F33.2 SEVERE EPISODE OF RECURRENT MAJOR DEPRESSIVE DISORDER, WITHOUT PSYCHOTIC FEATURES (HCC): ICD-10-CM

## 2025-02-03 NOTE — TELEPHONE ENCOUNTER
Received request via: Pharmacy    Was the patient seen in the last year in this department? Yes    Does the patient have an active prescription (recently filled or refills available) for medication(s) requested? No    [facility]     Does the patient have [facility] and need 100-day supply? (This applies to ALL medications) Patient does not have SCP

## 2025-03-04 ENCOUNTER — APPOINTMENT (OUTPATIENT)
Dept: MEDICAL GROUP | Facility: PHYSICIAN GROUP | Age: 65
End: 2025-03-04
Payer: MEDICARE

## 2025-03-11 ENCOUNTER — OFFICE VISIT (OUTPATIENT)
Dept: MEDICAL GROUP | Facility: PHYSICIAN GROUP | Age: 65
End: 2025-03-11
Payer: MEDICARE

## 2025-03-11 VITALS
HEIGHT: 60 IN | WEIGHT: 148.8 LBS | SYSTOLIC BLOOD PRESSURE: 118 MMHG | DIASTOLIC BLOOD PRESSURE: 64 MMHG | TEMPERATURE: 97.3 F | HEART RATE: 74 BPM | BODY MASS INDEX: 29.21 KG/M2 | OXYGEN SATURATION: 95 %

## 2025-03-11 DIAGNOSIS — Z79.899 HIGH RISK MEDICATIONS (NOT ANTICOAGULANTS) LONG-TERM USE: ICD-10-CM

## 2025-03-11 DIAGNOSIS — F33.2 SEVERE EPISODE OF RECURRENT MAJOR DEPRESSIVE DISORDER, WITHOUT PSYCHOTIC FEATURES (HCC): ICD-10-CM

## 2025-03-11 DIAGNOSIS — K76.89 HEPATIC CYST: ICD-10-CM

## 2025-03-11 DIAGNOSIS — E78.2 MIXED HYPERLIPIDEMIA: ICD-10-CM

## 2025-03-11 DIAGNOSIS — R92.1 BREAST CALCIFICATIONS ON MAMMOGRAM: ICD-10-CM

## 2025-03-11 DIAGNOSIS — F41.9 ANXIETY: ICD-10-CM

## 2025-03-11 DIAGNOSIS — Z12.31 ENCOUNTER FOR SCREENING MAMMOGRAM FOR MALIGNANT NEOPLASM OF BREAST: ICD-10-CM

## 2025-03-11 PROCEDURE — 3078F DIAST BP <80 MM HG: CPT | Performed by: NURSE PRACTITIONER

## 2025-03-11 PROCEDURE — 3074F SYST BP LT 130 MM HG: CPT | Performed by: NURSE PRACTITIONER

## 2025-03-11 PROCEDURE — 99214 OFFICE O/P EST MOD 30 MIN: CPT | Performed by: NURSE PRACTITIONER

## 2025-03-11 RX ORDER — ROSUVASTATIN CALCIUM 10 MG/1
10 TABLET, COATED ORAL EVERY EVENING
Qty: 90 TABLET | Refills: 3 | Status: SHIPPED | OUTPATIENT
Start: 2025-03-11

## 2025-03-11 ASSESSMENT — PATIENT HEALTH QUESTIONNAIRE - PHQ9
3. TROUBLE FALLING OR STAYING ASLEEP OR SLEEPING TOO MUCH: MORE THAN HALF THE DAYS
2. FEELING DOWN, DEPRESSED, IRRITABLE, OR HOPELESS: NOT AT ALL
8. MOVING OR SPEAKING SO SLOWLY THAT OTHER PEOPLE COULD HAVE NOTICED. OR THE OPPOSITE, BEING SO FIGETY OR RESTLESS THAT YOU HAVE BEEN MOVING AROUND A LOT MORE THAN USUAL: NOT AT ALL
5. POOR APPETITE OR OVEREATING: NOT AT ALL
4. FEELING TIRED OR HAVING LITTLE ENERGY: SEVERAL DAYS
SUM OF ALL RESPONSES TO PHQ QUESTIONS 1-9: 3
9. THOUGHTS THAT YOU WOULD BE BETTER OFF DEAD, OR OF HURTING YOURSELF: NOT AT ALL
1. LITTLE INTEREST OR PLEASURE IN DOING THINGS: NOT AT ALL
SUM OF ALL RESPONSES TO PHQ9 QUESTIONS 1 AND 2: 0
6. FEELING BAD ABOUT YOURSELF - OR THAT YOU ARE A FAILURE OR HAVE LET YOURSELF OR YOUR FAMILY DOWN: NOT AL ALL
7. TROUBLE CONCENTRATING ON THINGS, SUCH AS READING THE NEWSPAPER OR WATCHING TELEVISION: NOT AT ALL

## 2025-03-11 ASSESSMENT — ENCOUNTER SYMPTOMS
FEVER: 0
SHORTNESS OF BREATH: 0
PALPITATIONS: 0
INSOMNIA: 1
WEIGHT LOSS: 0
EYES NEGATIVE: 1
NERVOUS/ANXIOUS: 1
MUSCULOSKELETAL NEGATIVE: 1
DEPRESSION: 0
NEUROLOGICAL NEGATIVE: 1
CHILLS: 0

## 2025-03-11 NOTE — PROGRESS NOTES
Verbal consent was acquired by the patient to use [id number] ambient listening note generation during this visit     CC:  Chief Complaint   Patient presents with    Follow-Up     Yearly check up        Rima Schulzfelicity 64 y.o. female  patient presenting for     History of Present Illness  The patient presents today for refills and new imaging studies.    She has a history of liver cysts, with the most recent imaging conducted last year revealing a 2.1 cm right liver cyst and an additional simple-appearing 4 mm left liver cyst, along with some hepatic steatosis. She prefers to have these monitored annually.    She is also due for a mammogram. Given her age, potential cardiac concerns were discussed, and she expressed interest in undergoing a CT cardiac score test.    Her cholesterol level is 201, LDL is 101, triglycerides are 91, and HDL is 84. She is not interested in starting statin therapy but is open to exploring potential supplements, such as red rice yeast.    She completed a bone density scan on 2024, which showed osteoporosis. At that time, she was not interested in any medications and continues to hold this stance. She maintains her regimen of vitamin D, calcium, and strength training exercises.    Patient currently taking semaglutide with a provider in [place].  She states that she is lost 11 pounds since starting in January and denies any side effects.    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  She states that in the past she has taken Xanax 0.25 mg when her   and she was able to sleep better.  We discussed the concerns for extended use on Xanax and possibly using it as needed.  At this time she like to continue with her over-the-counter sleep medicine and we can follow-up later.        Review of Systems   Constitutional:  Negative for chills, fever, malaise/fatigue and weight  loss.   HENT: Negative.     Eyes: Negative.    Respiratory:  Negative for shortness of breath.    Cardiovascular:  Negative for chest pain and palpitations.   Genitourinary: Negative.    Musculoskeletal: Negative.    Skin: Negative.    Neurological: Negative.    Psychiatric/Behavioral:  Negative for depression. The patient is nervous/anxious and has insomnia.        /64 (BP Location: Left arm, Patient Position: Sitting, BP Cuff Size: Adult)   Pulse 74   Temp 36.3 °C (97.3 °F)   Ht 1.524 m (5')   Wt 67.5 kg (148 lb 12.8 oz)   SpO2 95% , Body mass index is 29.06 kg/m².    Physical Exam  Constitutional:       General: She is not in acute distress.     Appearance: Normal appearance. She is normal weight. She is not ill-appearing.   HENT:      Head: Normocephalic and atraumatic.   Pulmonary:      Effort: Pulmonary effort is normal.   Musculoskeletal:         General: Normal range of motion.   Skin:     General: Skin is warm and dry.   Neurological:      Mental Status: She is alert and oriented to person, place, and time.   Psychiatric:         Mood and Affect: Mood normal.         Behavior: Behavior normal.         Thought Content: Thought content normal.         Judgment: Judgment normal.           Results  Laboratory Studies  Cholesterol is 201, , Triglycerides 91, HDL 84.    Imaging  Imaging last year 2024 showed a 2.1 cm right liver cyst without any changes, a simple appearing 4 mm left liver cyst, and some hepatic steatosis.  Bone density scan on 06/04/2024 showed osteoporosis.    Assessment and Plan    Assessment & Plan  1. Hepatic cysts.  She has a history of liver cysts, with the most recent imaging showing a 2.1 cm right liver cyst and a 4 mm left liver cyst, both simple in nature. There is also evidence of hepatic steatosis. She prefers to have these monitored annually.    2. Osteoporosis.  She was diagnosed with osteoporosis following a bone density scan on 06/04/2024. She is not interested  in medication for this condition. She continues to take vitamin D and calcium supplements and engages in strength training exercises.    3. Hypercholesterolemia.  Her cholesterol levels are elevated, with a total cholesterol of 201, LDL of 101, triglycerides of 91, and HDL of 84. She is not interested in starting statin therapy. She is considering red yeast rice as a supplement to manage her cholesterol levels.    4. Health Maintenance.  She is due for a mammogram. Additionally, she is interested in undergoing a CT cardiac score test to assess for any cardiac concerns.       1. Hepatic cyst  Chronic and stable condition   - US-RUQ; Future    2. Breast calcifications on mammogram  Chronic and stable condition     3. Encounter for screening mammogram for malignant neoplasm of breast  - MA-SCREENING MAMMO BILAT W/TOMOSYNTHESIS W/CAD; Future    4. High risk medications (not anticoagulants) long-term use  Chronic and stable condition     5. Anxiety  Chronic and stable condition     6. Mixed hyperlipidemia  Chronic and stable condition   - rosuvastatin (CRESTOR) 10 MG Tab; Take 1 Tablet by mouth every evening.  Dispense: 90 Tablet; Refill: 3  - CT-CARDIAC SCORING; Future    7. Severe episode of recurrent major depressive disorder, without psychotic features (HCC)  Chronic and stable condition   - FLUoxetine (PROZAC) 20 MG Cap; Take 1 Capsule by mouth every day.  Dispense: 90 Capsule; Refill: 3       Discussed with patient possible alternative diagnoses, patient is to take all medications as prescribed.     If symptoms persist FU w/PCP, if symptoms worsen go to emergency room.     If experiencing any side effects from prescribed medications reports to the office immediately or go to emergency room.    Reviewed indication, dosage, usage and potential adverse effects of prescribed medications.     Reviewed risks and benefits of treatment plan. Patient verbalizes understanding of all instruction and verbally agrees to  plan.    Return for as needed.    This note was created using voice recognition software (nCino). The accuracy of the dictation is limited by the abilities of the software. I have reviewed the note prior to signing, however some errors in grammar and context are still possible. If you have any questions related to this note please do not hesitate to contact our office.

## 2025-03-25 ENCOUNTER — HOSPITAL ENCOUNTER (OUTPATIENT)
Dept: RADIOLOGY | Facility: MEDICAL CENTER | Age: 65
End: 2025-03-25
Attending: NURSE PRACTITIONER
Payer: MEDICARE

## 2025-03-25 DIAGNOSIS — Z12.31 ENCOUNTER FOR SCREENING MAMMOGRAM FOR MALIGNANT NEOPLASM OF BREAST: ICD-10-CM

## 2025-03-25 PROCEDURE — 77067 SCR MAMMO BI INCL CAD: CPT

## 2025-03-28 ENCOUNTER — RESULTS FOLLOW-UP (OUTPATIENT)
Dept: MEDICAL GROUP | Facility: PHYSICIAN GROUP | Age: 65
End: 2025-03-28

## 2025-03-31 DIAGNOSIS — J32.0 CHRONIC MAXILLARY SINUSITIS: ICD-10-CM

## 2025-03-31 NOTE — TELEPHONE ENCOUNTER
Received request via: Patient    Was the patient seen in the last year in this department? Yes    Does the patient have an active prescription (recently filled or refills available) for medication(s) requested? No    [facility]    Does the patient have care home Plus and need 100-day supply? (This applies to ALL medications) Patient does not have SCP

## 2025-04-01 RX ORDER — FLUTICASONE PROPIONATE 50 MCG
SPRAY, SUSPENSION (ML) NASAL
Qty: 16 G | Refills: 6 | Status: SHIPPED | OUTPATIENT
Start: 2025-04-01

## 2025-04-14 ENCOUNTER — APPOINTMENT (OUTPATIENT)
Dept: RADIOLOGY | Facility: MEDICAL CENTER | Age: 65
End: 2025-04-14
Attending: NURSE PRACTITIONER
Payer: COMMERCIAL

## 2025-04-14 ENCOUNTER — APPOINTMENT (OUTPATIENT)
Dept: RADIOLOGY | Facility: MEDICAL CENTER | Age: 65
End: 2025-04-14
Attending: NURSE PRACTITIONER
Payer: MEDICARE

## 2025-05-06 ENCOUNTER — APPOINTMENT (OUTPATIENT)
Dept: RADIOLOGY | Facility: MEDICAL CENTER | Age: 65
End: 2025-05-06
Attending: NURSE PRACTITIONER
Payer: MEDICARE

## 2025-05-13 ENCOUNTER — HOSPITAL ENCOUNTER (OUTPATIENT)
Dept: RADIOLOGY | Facility: MEDICAL CENTER | Age: 65
End: 2025-05-13
Attending: NURSE PRACTITIONER
Payer: MEDICARE

## 2025-05-13 DIAGNOSIS — K76.89 HEPATIC CYST: ICD-10-CM

## 2025-05-13 PROCEDURE — 76705 ECHO EXAM OF ABDOMEN: CPT

## 2025-05-30 ENCOUNTER — RESULTS FOLLOW-UP (OUTPATIENT)
Dept: MEDICAL GROUP | Facility: PHYSICIAN GROUP | Age: 65
End: 2025-05-30

## 2025-08-09 ENCOUNTER — APPOINTMENT (OUTPATIENT)
Dept: RADIOLOGY | Facility: MEDICAL CENTER | Age: 65
End: 2025-08-09
Attending: NURSE PRACTITIONER
Payer: MEDICARE